# Patient Record
Sex: FEMALE | HISPANIC OR LATINO | Employment: STUDENT | URBAN - METROPOLITAN AREA
[De-identification: names, ages, dates, MRNs, and addresses within clinical notes are randomized per-mention and may not be internally consistent; named-entity substitution may affect disease eponyms.]

---
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
## 2020-01-29 ENCOUNTER — OFFICE VISIT (OUTPATIENT)
Dept: MEDICAL GROUP | Facility: PHYSICIAN GROUP | Age: 20
End: 2020-01-29
Payer: COMMERCIAL

## 2020-01-29 VITALS
WEIGHT: 172 LBS | HEART RATE: 97 BPM | DIASTOLIC BLOOD PRESSURE: 74 MMHG | SYSTOLIC BLOOD PRESSURE: 112 MMHG | BODY MASS INDEX: 34.68 KG/M2 | OXYGEN SATURATION: 99 % | HEIGHT: 59 IN | RESPIRATION RATE: 14 BRPM | TEMPERATURE: 98.5 F

## 2020-01-29 DIAGNOSIS — F90.9 ATTENTION DEFICIT HYPERACTIVITY DISORDER (ADHD), UNSPECIFIED ADHD TYPE: ICD-10-CM

## 2020-01-29 DIAGNOSIS — N91.2 AMENORRHEA: ICD-10-CM

## 2020-01-29 PROBLEM — E66.9 OBESITY (BMI 30-39.9): Status: ACTIVE | Noted: 2020-01-29

## 2020-01-29 PROCEDURE — 99214 OFFICE O/P EST MOD 30 MIN: CPT | Performed by: PHYSICIAN ASSISTANT

## 2020-01-29 ASSESSMENT — PATIENT HEALTH QUESTIONNAIRE - PHQ9: CLINICAL INTERPRETATION OF PHQ2 SCORE: 0

## 2020-01-29 NOTE — PROGRESS NOTES
"Subjective:   Eda Rothman is a 19 y.o. female here today for amenorrhea. Is a new patient to me and is also establishing care today.    Previous PCP: [facility]    HPI:    Patient presents to the office today to establish care with me. Her primary concern is regarding lack of menstruation. States hasn't had her period in about a year. Endorses prior Depo-provera use--estimates was on for about a year from age [age]. Last Depo shot was in October 2018. She states that this was originally started to control bad cramps. Denies ever being sexually active. Menarche was at 11-12. Endorses always having irregular periods--anything from multiple bleeding episodes of bleeding in a single month from going months in-between periods. No known h/o PCOS. Has noticed some facial acne recently, but nothing that she considers severe. Considers herself to always have been generally \"hairy\" person since childhood but nothing that she has considered to be abnormal.    Only other known medical problem is ADHD, diagnosed in childhood. Was on medication starting at age 15, but states she was taken off her Jay year as her parents didn't like the idea of her being on medication. She feels she is able to control symptoms fine. Does struggle with inattention/focus, but has been able to manage with things like daily reminder notes, alarms on her phone, etc.    Current medicines (including changes today)  Current Outpatient Medications   Medication Sig Dispense Refill   • Loratadine (CLARITIN PO) Take  by mouth.     • CHILD IBUPROFEN PO Take  by mouth.     • Acetaminophen (TYLENOL CHILDRENS PO) Take  by mouth.       No current facility-administered medications for this visit.      She  has a past medical history of ADHD. She also has no past medical history of ASTHMA or Diabetes.    ROS  As per HPI.       Objective:     /74 (BP Location: Right arm, Patient Position: Sitting, BP Cuff Size: Adult)   Pulse 97   Temp 36.9 " "°C (98.5 °F) (Temporal)   Resp 14   Ht 1.499 m (4' 11\")   Wt 78 kg (172 lb)   SpO2 99%  Body mass index is 34.74 kg/m².     Physical Exam:  Constitutional: Alert, well-appearing, no distress.  Skin: Warm, dry, good turgor, no rashes in visible areas. Mild facial acne--mostly comedonal scattered across forehead.  Eye: Pupils are equal and round, conjunctiva clear, lids normal.  ENMT: Lips without lesions, moist mucus membranes.  Neck: No masses. No submandibular or cervical lymphadenopathy. No palpable thyromegaly.  Respiratory: Unlabored respiratory effort, lungs clear to auscultation, no wheezes, no rhonchi.  Cardiovascular: Normal S1, S2, no murmur.      Assessment and Plan:   The following treatment plan was discussed    1. Amenorrhea  New problem, uncontrolled. Has had amenorrhea for 15 months. We discussed that there is possibility this represents prolonged amenorrhea post-Depo, but typically expect monthly cycles to return within a year. Especially given history of irregular periods even prior to starting Depo, recommend additional amenorrhea work-up. PCOS is consideration given her obesity, acne. Lab work ordered. Patient advised to follow up in a few weeks to discuss results.   - TESTOSTERONE F&T FEMALES/CHILD; Future  - TSH WITH REFLEX TO FT4; Future  - PROLACTIN; Future  - FSH/LH; Future  - ESTRADIOL; Future  - HCG QUANTITATIVE; Future  - CBC WITH DIFFERENTIAL; Future  - Comp Metabolic Panel; Future    2. Attention deficit hyperactivity disorder (ADHD), unspecified ADHD type  New problem to me, well-controlled with lifestyle modification. Will continue to monitor.        Followup: Return in about 3 weeks (around 2/19/2020) for f/u lab review; Short.    Danyelle Christianson P.A.-C.             "

## 2020-02-03 ENCOUNTER — NON-PROVIDER VISIT (OUTPATIENT)
Dept: OCCUPATIONAL MEDICINE | Facility: CLINIC | Age: 20
End: 2020-02-03

## 2020-02-03 DIAGNOSIS — Z02.1 PRE-EMPLOYMENT DRUG SCREENING: ICD-10-CM

## 2020-02-03 DIAGNOSIS — Z02.1 PRE-EMPLOYMENT HEALTH SCREENING EXAMINATION: ICD-10-CM

## 2020-02-03 LAB
AMP AMPHETAMINE: NORMAL
COC COCAINE: NORMAL
INT CON NEG: NORMAL
INT CON POS: NORMAL
MET METHAMPHETAMINES: NORMAL
OPI OPIATES: NORMAL
PCP PHENCYCLIDINE: NORMAL
POC DRUG COMMENT 753798-OCCUPATIONAL HEALTH: NEGATIVE
THC: NORMAL

## 2020-02-03 PROCEDURE — 80305 DRUG TEST PRSMV DIR OPT OBS: CPT | Performed by: NURSE PRACTITIONER

## 2020-02-18 ENCOUNTER — APPOINTMENT (OUTPATIENT)
Dept: MEDICAL GROUP | Facility: PHYSICIAN GROUP | Age: 20
End: 2020-02-18
Payer: COMMERCIAL

## 2020-06-25 ENCOUNTER — OFFICE VISIT (OUTPATIENT)
Dept: MEDICAL GROUP | Facility: PHYSICIAN GROUP | Age: 20
End: 2020-06-25
Payer: COMMERCIAL

## 2020-06-25 VITALS
OXYGEN SATURATION: 96 % | HEIGHT: 59 IN | SYSTOLIC BLOOD PRESSURE: 90 MMHG | WEIGHT: 166 LBS | DIASTOLIC BLOOD PRESSURE: 52 MMHG | BODY MASS INDEX: 33.47 KG/M2 | TEMPERATURE: 98.5 F | HEART RATE: 83 BPM

## 2020-06-25 DIAGNOSIS — L74.512 PRIMARY FOCAL HYPERHIDROSIS OF PALMS: ICD-10-CM

## 2020-06-25 DIAGNOSIS — F32.1 CURRENT MODERATE EPISODE OF MAJOR DEPRESSIVE DISORDER WITHOUT PRIOR EPISODE (HCC): ICD-10-CM

## 2020-06-25 DIAGNOSIS — K58.0 IRRITABLE BOWEL SYNDROME WITH DIARRHEA: ICD-10-CM

## 2020-06-25 DIAGNOSIS — N91.2 AMENORRHEA: ICD-10-CM

## 2020-06-25 DIAGNOSIS — F90.9 ATTENTION DEFICIT HYPERACTIVITY DISORDER (ADHD), UNSPECIFIED ADHD TYPE: ICD-10-CM

## 2020-06-25 DIAGNOSIS — E66.9 OBESITY (BMI 30-39.9): ICD-10-CM

## 2020-06-25 PROCEDURE — 99214 OFFICE O/P EST MOD 30 MIN: CPT | Performed by: PHYSICIAN ASSISTANT

## 2020-06-25 ASSESSMENT — PATIENT HEALTH QUESTIONNAIRE - PHQ9
CLINICAL INTERPRETATION OF PHQ2 SCORE: 3
5. POOR APPETITE OR OVEREATING: 3 - NEARLY EVERY DAY
SUM OF ALL RESPONSES TO PHQ QUESTIONS 1-9: 16

## 2020-06-26 NOTE — PATIENT INSTRUCTIONS
Aluminum Chloride topical solution  What is this medicine?  Aluminum Chloride (a LOO mi num klor vivi) is used to control excessive sweating.  This medicine may be used for other purposes; ask your health care provider or pharmacist if you have questions.  COMMON BRAND NAME(S): Drysol, Hypercare, Xerac AC  What should I tell my health care provider before I take this medicine?  They need to know if you have any of these conditions:  · an unusual or allergic reaction to aluminum chloride, other medicines, foods, dyes, or preservatives  · pregnant or trying to get pregnant  · breast-feeding  How should I use this medicine?  This medicine is for external use only. Follow the directions on the prescription label. Make sure the skin is dry before use. Apply to the affected area as directed by your doctor or health care professional, usually at bedtime. Avoid contact with broken, irritated or recently shaved skin. Do not use your medicine more often than directed.  Talk to your pediatrician regarding the use of this medicine in children. Special care may be needed.  Overdosage: If you think you have taken too much of this medicine contact a poison control center or emergency room at once.  NOTE: This medicine is only for you. Do not share this medicine with others.  What if I miss a dose?  If you miss a dose, use it as soon as you can. If it is almost time for your next dose, use only that dose. Do not use double or extra doses.  What may interact with this medicine?  Interactions are not expected. Do not use any other skin products on the affected area without asking your doctor or health care professional.  This list may not describe all possible interactions. Give your health care provider a list of all the medicines, herbs, non-prescription drugs, or dietary supplements you use. Also tell them if you smoke, drink alcohol, or use illegal drugs. Some items may interact with your medicine.  What should I watch for while  using this medicine?  You may notice a decrease in sweating after two treatments. Call your doctor or health care professional if your condition does not start to get better or if it gets worse.  To help increase the effect of this medicine, your doctor or health care professional may tell you to cover the treated area with saran wrap held in place by a snug fitting t-shirt, mitten or sock. Do not use tape to hold the saran wrap in place.  This medicine may discolor fabrics and may be harmful to certain metals. Avoid contact with clothing and jewelry.  Do not use this medicine near open flame.  What side effects may I notice from receiving this medicine?  Side effects that you should report to your doctor or health care professional as soon as possible:  · allergic reactions like skin rash, itching or hives, swelling of the face, lips, or tongue  · excessive irritation or sensitivity  Side effects that usually do not require medical attention (report to your doctor or health care professional if they continue or are bothersome):  · mild irritation  This list may not describe all possible side effects. Call your doctor for medical advice about side effects. You may report side effects to FDA at 6-295-FDA-4672.  Where should I keep my medicine?  Keep out of the reach of children.  Store at room temperature between 15 and 30 degrees C (59 and 86 degrees C). Throw away any unused medicine after the expiration date.  NOTE: This sheet is a summary. It may not cover all possible information. If you have questions about this medicine, talk to your doctor, pharmacist, or health care provider.  © 2020 [employer]/Gold Standard (2014-10-22 17:30:08)

## 2020-06-26 NOTE — PROGRESS NOTES
"Subjective:   Eda Rothman is a 19 y.o. female here today for follow-up on OCPs, multiple other concerns. Is an established patient of mine.    HPI:    Patient presents to the office today to discuss the following:     -is requesting to start on Depo-Provera. Has used in the past--most recently in 2018. The only side effect she noticed was that she felt it made her somewhat more \"emotional\", but nothing that she considers to be significant. I last saw patient in January of this year.  At that time, she had complained of amenorrhea.  Had not had her menstrual period in about a year at that time and endorsed long history of irregular menstruation.  Lab panel was ordered which she has not yet had done.     -complains of excessively sweaty hands. States her hands have always been this way but it's getting to the point where she's very bothered by it. States her hands will get really smelly. Wondering this there's anything she can do for this.    -complains of ongoing stomach issues. States that on a daily basis, she's been developing a cramping sensation in her lower abdomen along with a feeling of being \"bloated.\" Notices this mostly just in the mornings and at night. States that within 15 minutes of getting up in the morning, she'll develop the abdominal pain along with fecal urgency to the point where she'll feel the need to run to the bathroom. When she does go to the bathroom, will pass soft, unformed stool, but then feel immediately better. She notices this same pattern of symptoms after dinner in the evening. She denies any hematochezia or melena. Has not had nausea or vomiting, but has noticed increased belching. She's not exactly sure how long symptoms have been going on for. She has noticed that symptoms seem to be worse when she eats/drinks lactose-containing foods/drinks, but has noticed when she consumes other things, too. She has not yet tried any over-the-counter medication for her symptoms.    -patient " "also expresses concern regarding depression. She tells me that lately, she's noticed that she'll get very down. She has a lot of feelings that she's disappointed her family. Often will cry for no apparent reason. She worries a lot about her future. States that her family has very high expectations for her and feels like they \"talk down\" to her. She's currently attending college at [employer]. 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 She feels that she struggles a lot in school. Endorses significant difficulty concentrating and has trouble with deadlines and procrastination. She carries diagnosis of ADHD. Was on stimulant medication (Ritalin) in high school, but hasn't taken since 2016. She states that her parents don't like the idea of her being on medication which is why she stopped it. She currently works for the [employer]. She enjoys her job very much and doesn't feel that her depression has affected her ability to do her job at all. She is able to feel happiness when around her friends, as she feels that her friends lift her up more than her family does. She's had some scattered infrequent thoughts of suicide but nothing that she's planned or feels she would act on. PHQ-9 screening was completed today--see below.    Depression Screening    Little interest or pleasure in doing things?  1 - several days   Feeling down, depressed , or hopeless? 2 - more than half the days   Trouble falling or staying asleep, or sleeping too much?  3 - nearly every day   Feeling tired or having little energy?  2 - more than half the days   Poor appetite or overeating?  3 - nearly every day   Feeling bad about yourself - or that you are a failure or have let yourself or your family down? 1 - several days   Trouble concentrating on things, such as reading the newspaper or watching television? 3 - nearly every day   Moving or speaking so slowly that other " "people could have noticed.  Or the opposite - being so fidgety or restless that you have been moving around a lot more than usual?  0 - not at all   Thoughts that you would be better off dead, or of hurting yourself?  1 - several days   Patient Health Questionnaire Score: 16       Current medicines (including changes today)  Current Outpatient Medications   Medication Sig Dispense Refill   • aluminum chloride (DRYSOL) 20 % external solution Apply once daily to hands at bedtime; once excessive sweating has stopped, may decrease to once or twice weekly, or as needed. Wash treated area in the morning. 1 Bottle 5     No current facility-administered medications for this visit.      She  has a past medical history of ADHD. She also has no past medical history of ASTHMA or Diabetes.    ROS  As per HPI.       Objective:     BP (!) 90/52 (BP Location: Left arm, Patient Position: Sitting, BP Cuff Size: Adult)   Pulse 83   Temp 36.9 °C (98.5 °F) (Tympanic)   Ht 1.499 m (4' 11\")   Wt 75.3 kg (166 lb)   SpO2 96%  Body mass index is 33.53 kg/m².     Physical Exam:  Constitutional: Alert, no distress.  Psychiatric: Fully oriented with fluent speech. Affect is appropriate with euthymic mood. Patient maintains good eye contact, appears pleasant and conversational. No psychomotor agitation. Insight and judgment are intact.  Skin: No rashes in visible areas.  Eye: Pupils are equal and round, conjunctiva clear, lids normal.  ENMT: Lips without lesions, moist mucus membranes.  Neck: Normal-appearing.  Respiratory: Unlabored respiratory effort.      Assessment and Plan:   The following treatment plan was discussed    1. Amenorrhea  Established problem, uncontrolled. Prior to initiation of birth control method, would like patient to have labs done. Advised to schedule follow-up appointment afterwards to discuss results. If lab work is not suggestive of PCOS, amenorrhea likely secondary to obesity.    2. Primary focal hyperhidrosis " of palms  New problem, uncontrolled. Has localized excessive sweating of palms which has been present since childhood. We discussed that this represents condition called hyperhidrosis. Will trial on topical Drysol solution. Patient agreeable.  - aluminum chloride (DRYSOL) 20 % external solution; Apply once daily to hands at bedtime; once excessive sweating has stopped, may decrease to once or twice weekly, or as needed. Wash treated area in the morning.  Dispense: 1 Bottle; Refill: 5    3. Irritable bowel syndrome with diarrhea  New problem, uncontrolled. Patient's GI symptoms are most consistent with likely IBS. No current red flag symptoms to suggest more sinister diagnosis. We discussed importance of starting food diary to pinpoint other triggers besides lactose. We discussed that she can use OTC Imodium as-needed for diarrhea. Will continue to monitor symptoms.    4. Current moderate episode of major depressive disorder without prior episode (HCC)  New problem, uncontrolled. Patient has been feeling depressed lately, mostly triggered by her belief that she isn't living up to her family's expectations. Her depression has not affected her job at all and she denies any active suicide intent. I do think that her ADHD is contributing to her depression given poor school performance from lack of concentration. I am recommending behavioral health evaluation to discuss getting back on ADHD medication and/or possibly anti-depressant.  - REFERRAL TO BEHAVIORAL HEALTH  - Patient has been identified as having a positive depression screening. Appropriate orders and counseling have been given.    5. Attention deficit hyperactivity disorder (ADHD), unspecified ADHD type  Established problem, uncontrolled, see above.  - REFERRAL TO BEHAVIORAL HEALTH    6. Obesity (BMI 30-39.9)  - Patient identified as having weight management issue.  Appropriate orders and counseling given.      Followup: Return for f/u lab results;  [place].    Danyelle Christianson P.A.-C.

## 2020-07-01 ENCOUNTER — APPOINTMENT (OUTPATIENT)
Dept: LAB | Facility: MEDICAL CENTER | Age: 20
End: 2020-07-01
Attending: PHYSICIAN ASSISTANT

## 2020-07-09 LAB
ALBUMIN SERPL-MCNC: 4.7 G/DL (ref 3.9–5)
ALBUMIN/GLOB SERPL: 1.9 {RATIO} (ref 1.2–2.2)
ALP SERPL-CCNC: 96 IU/L (ref 39–117)
ALT SERPL-CCNC: 21 IU/L (ref 0–32)
AST SERPL-CCNC: 20 IU/L (ref 0–40)
BASOPHILS # BLD AUTO: 0 X10E3/UL (ref 0–0.2)
BASOPHILS NFR BLD AUTO: 0 %
BILIRUB SERPL-MCNC: 0.4 MG/DL (ref 0–1.2)
BUN SERPL-MCNC: 13 MG/DL (ref 6–20)
BUN/CREAT SERPL: 19 (ref 9–23)
CALCIUM SERPL-MCNC: 9.6 MG/DL (ref 8.7–10.2)
CHLORIDE SERPL-SCNC: 104 MMOL/L (ref 96–106)
CO2 SERPL-SCNC: 22 MMOL/L (ref 20–29)
CREAT SERPL-MCNC: 0.67 MG/DL (ref 0.57–1)
EOSINOPHIL # BLD AUTO: 0 X10E3/UL (ref 0–0.4)
EOSINOPHIL NFR BLD AUTO: 1 %
ERYTHROCYTE [DISTWIDTH] IN BLOOD BY AUTOMATED COUNT: 12.9 % (ref 11.7–15.4)
ESTRADIOL SERPL-MCNC: 34 PG/ML
FSH SERPL-ACNC: 4.5 MIU/ML
GLOBULIN SER CALC-MCNC: 2.5 G/DL (ref 1.5–4.5)
GLUCOSE SERPL-MCNC: 86 MG/DL (ref 65–99)
HCG INTACT+B SERPL-ACNC: <1 MIU/ML
HCT VFR BLD AUTO: 45.5 % (ref 34–46.6)
HGB BLD-MCNC: 15.4 G/DL (ref 11.1–15.9)
IMM GRANULOCYTES # BLD AUTO: 0 X10E3/UL (ref 0–0.1)
IMM GRANULOCYTES NFR BLD AUTO: 0 %
IMMATURE CELLS  115398: NORMAL
LH SERPL-ACNC: 3.2 MIU/ML
LYMPHOCYTES # BLD AUTO: 2.9 X10E3/UL (ref 0.7–3.1)
LYMPHOCYTES NFR BLD AUTO: 35 %
MCH RBC QN AUTO: 29.2 PG (ref 26.6–33)
MCHC RBC AUTO-ENTMCNC: 33.8 G/DL (ref 31.5–35.7)
MCV RBC AUTO: 86 FL (ref 79–97)
MONOCYTES # BLD AUTO: 0.6 X10E3/UL (ref 0.1–0.9)
MONOCYTES NFR BLD AUTO: 7 %
MORPHOLOGY BLD-IMP: NORMAL
NEUTROPHILS # BLD AUTO: 4.6 X10E3/UL (ref 1.4–7)
NEUTROPHILS NFR BLD AUTO: 57 %
NRBC BLD AUTO-RTO: NORMAL %
PLATELET # BLD AUTO: 318 X10E3/UL (ref 150–450)
POTASSIUM SERPL-SCNC: 4.2 MMOL/L (ref 3.5–5.2)
PROLACTIN SERPL-MCNC: 22.7 NG/ML (ref 4.8–23.3)
PROT SERPL-MCNC: 7.2 G/DL (ref 6–8.5)
RBC # BLD AUTO: 5.27 X10E6/UL (ref 3.77–5.28)
SODIUM SERPL-SCNC: 140 MMOL/L (ref 134–144)
TESTOST FREE SERPL-MCNC: 3.9 PG/ML
TESTOST SERPL-MCNC: 22.9 NG/DL (ref 10–55)
TSH SERPL DL<=0.005 MIU/L-ACNC: 1.07 UIU/ML (ref 0.45–4.5)
WBC # BLD AUTO: 8.1 X10E3/UL (ref 3.4–10.8)

## 2020-07-15 ENCOUNTER — TELEPHONE (OUTPATIENT)
Dept: MEDICAL GROUP | Facility: PHYSICIAN GROUP | Age: 20
End: 2020-07-15

## 2020-07-15 NOTE — TELEPHONE ENCOUNTER
----- Message from Danyelle Christianson P.A.-C. sent at 7/14/2020  9:34 AM PDT -----  Please call patient to remind her to schedule follow-up to go through lab results. She never read the [email or web address] message I previously sent.  Danyelle Christianson P.A.-C.

## 2020-07-17 ENCOUNTER — OFFICE VISIT (OUTPATIENT)
Dept: MEDICAL GROUP | Facility: PHYSICIAN GROUP | Age: 20
End: 2020-07-17
Payer: COMMERCIAL

## 2020-07-17 VITALS
TEMPERATURE: 98.6 F | RESPIRATION RATE: 16 BRPM | WEIGHT: 161.4 LBS | HEART RATE: 76 BPM | HEIGHT: 59 IN | OXYGEN SATURATION: 98 % | DIASTOLIC BLOOD PRESSURE: 60 MMHG | BODY MASS INDEX: 32.54 KG/M2 | SYSTOLIC BLOOD PRESSURE: 112 MMHG

## 2020-07-17 DIAGNOSIS — Z30.013 INITIATION OF DEPO PROVERA: ICD-10-CM

## 2020-07-17 DIAGNOSIS — E66.9 OBESITY (BMI 30-39.9): ICD-10-CM

## 2020-07-17 LAB
INT CON NEG: NEGATIVE
INT CON POS: POSITIVE
POC URINE PREGNANCY TEST: NORMAL

## 2020-07-17 PROCEDURE — 99213 OFFICE O/P EST LOW 20 MIN: CPT | Mod: 25 | Performed by: PHYSICIAN ASSISTANT

## 2020-07-17 PROCEDURE — 81025 URINE PREGNANCY TEST: CPT | Performed by: PHYSICIAN ASSISTANT

## 2020-07-17 RX ORDER — MEDROXYPROGESTERONE ACETATE 150 MG/ML
150 INJECTION, SUSPENSION INTRAMUSCULAR ONCE
Status: COMPLETED | OUTPATIENT
Start: 2020-07-17 | End: 2020-07-17

## 2020-07-17 RX ADMIN — MEDROXYPROGESTERONE ACETATE 150 MG: 150 INJECTION, SUSPENSION INTRAMUSCULAR at 17:15

## 2020-07-17 ASSESSMENT — FIBROSIS 4 INDEX: FIB4 SCORE: 0.26

## 2020-07-17 NOTE — PROGRESS NOTES
"Subjective:   Eda Rothman is a 19 y.o. female here today for birth control, lab results. Is an established patient of mine.    HPI:    Patient presents to the office today for discussion of the above. She recently had screening lab work done for further work-up of oligomenorrhea which was unremarkable. Patient has had two menstrual periods in the last 6 months--February and most recently in late June. She was previously on Depo-Provera injections which she stopped in 2018. Previously tolerated well. Does not feel she'd do well with OCPs due to forgetting to take.       Current medicines (including changes today)  Current Outpatient Medications   Medication Sig Dispense Refill   • aluminum chloride (DRYSOL) 20 % external solution Apply once daily to hands at bedtime; once excessive sweating has stopped, may decrease to once or twice weekly, or as needed. Wash treated area in the morning. 1 Bottle 5     Current Facility-Administered Medications   Medication Dose Route Frequency Provider Last Rate Last Dose   • medroxyPROGESTERone (DEPO-PROVERA) injection 150 mg  150 mg Intramuscular Once Danyelle Christianson P.A.-C.         She  has a past medical history of ADHD. She also has no past medical history of ASTHMA or Diabetes.    ROS  As per HPI.       Objective:     /60 (BP Location: Right arm, Patient Position: Sitting, BP Cuff Size: Adult)   Pulse 76   Temp 37 °C (98.6 °F) (Tympanic)   Resp 16   Ht 1.499 m (4' 11\")   Wt 73.2 kg (161 lb 6.4 oz)   SpO2 98%  Body mass index is 32.6 kg/m².     Physical Exam:  Constitutional: Alert, well-appearing, very pleasant, no distress.  Psychiatric: Fully oriented with fluent speech. Affect is appropriate with euthymic mood.  Skin: No rashes in visible areas.  Eye: Pupils are equal and round, conjunctiva clear, lids normal.  ENMT: External ears and nose without lesions. Lips without lesions, moist mucus membranes.  Neck: Normal-appearing.  Respiratory: Unlabored " respiratory effort.      Assessment and Plan:   The following treatment plan was discussed    1. Initiation of Depo Provera  New concern. Patient with unremarkable lab work meaning that oligomenorrhea likely secondary to her obesity. She is wanting to re-start Depo which she did fine on before. We discussed risk of weight gain. Recommend she closely monitor her weight while taking. Urine pregnancy test today was negative so she was given her first injection. Instructed to f/u in clinic every 3 months for repeat injection.   - POC URINE PREGNANCY  - medroxyPROGESTERone (DEPO-PROVERA) injection 150 mg    2. Obesity (BMI 30-39.9)  Established problem, uncontrolled. She plans to closely monitor her weight going forward. Will continue lifestyle modifications in an effort to lose weight.      Followup: Return for establish care with new PCP.    Danyelle Christianson P.A.-C.

## 2020-07-29 DIAGNOSIS — F90.2 ATTENTION DEFICIT HYPERACTIVITY DISORDER (ADHD), COMBINED TYPE: ICD-10-CM

## 2020-07-29 DIAGNOSIS — F32.1 CURRENT MODERATE EPISODE OF MAJOR DEPRESSIVE DISORDER WITHOUT PRIOR EPISODE (HCC): ICD-10-CM

## 2020-07-30 ENCOUNTER — DOCUMENTATION (OUTPATIENT)
Dept: BEHAVIORAL HEALTH | Facility: CLINIC | Age: 20
End: 2020-07-30

## 2020-07-30 ENCOUNTER — TELEMEDICINE (OUTPATIENT)
Dept: BEHAVIORAL HEALTH | Facility: CLINIC | Age: 20
End: 2020-07-30

## 2020-07-30 VITALS — HEIGHT: 60 IN | BODY MASS INDEX: 30.82 KG/M2 | WEIGHT: 157 LBS

## 2020-07-30 DIAGNOSIS — F32.1 CURRENT MODERATE EPISODE OF MAJOR DEPRESSIVE DISORDER WITHOUT PRIOR EPISODE (HCC): ICD-10-CM

## 2020-07-30 DIAGNOSIS — F90.2 ADHD (ATTENTION DEFICIT HYPERACTIVITY DISORDER), COMBINED TYPE: ICD-10-CM

## 2020-07-30 PROCEDURE — 90792 PSYCH DIAG EVAL W/MED SRVCS: CPT | Performed by: PSYCHIATRY & NEUROLOGY

## 2020-07-30 RX ORDER — AMOXICILLIN 500 MG/1
500 CAPSULE ORAL
COMMUNITY
Start: 2020-07-22 | End: 2020-11-06

## 2020-07-30 RX ORDER — METHYLPHENIDATE HYDROCHLORIDE 20 MG/1
20 TABLET ORAL 2 TIMES DAILY
Qty: 60 TAB | Refills: 0 | Status: SHIPPED | OUTPATIENT
Start: 2020-07-30 | End: 2020-08-21 | Stop reason: SDUPTHER

## 2020-07-30 RX ORDER — FLUOXETINE HYDROCHLORIDE 20 MG/1
20 CAPSULE ORAL DAILY
Qty: 30 CAP | Refills: 2 | Status: SHIPPED | OUTPATIENT
Start: 2020-07-30 | End: 2020-08-21 | Stop reason: SDUPTHER

## 2020-07-30 ASSESSMENT — FIBROSIS 4 INDEX: FIB4 SCORE: 0.26

## 2020-07-30 NOTE — PROGRESS NOTES
" [facility] INITIAL PSYCHIATRIC EVALUATION    This provider informed the patient their medical records are totally confidential except for the use by other providers involved in their care, or if the patient signs a release, or to report instances of child or elder abuse, or if it is determined they are an immediate risk to harm themselves or others.  To avoid spread of covid-019 virus this appointment was conducted by telehealth.  The patient gave consent to have this evaluation by video telehealth.    Time at beginning of call:  10:30 AM    TOTAL FACE-TO-FACE TIME 60 minutes    CHIEF COMPLAINT       Having depressed mood and problems with focus of attention.    IDENTIFYING INFORMATION       The patient is a single 18yo W female who is enrolled at [employer] for criminal justice major and who lives with her parents in [place], [place].    HISTORY OF PRESENT ILLNESS       The patient has had Major Depression, Single Episode since Jun 2020 characterized by a typical pattern of anhedonia, hypersomnia (she sleepd 9 hours a day) and increased appetite, markedly decreased energy, concentration, interest, and motivation, feeling worthless and hopeless, having moderate psychomotor retardation, and having fleeting passive suicidal ideation that \"people might better off without her\".  She denies ever having an overt suicidal plan, intent, impulse, or attempt.  Wen often feels she has disappointed her family since they have high expectations for her.         She also has ADHD, Combined Type which was treated when she was in high school with the stimulant methylphenidate.   She has symptoms of inattention including: failing to give close attention to details, having difficulty sustaining attention, not seeming to listen when spoken to directly, not following through on instructions, having difficulty organizing tasks, losing things necessary for tasks, being easily distracted by extraneous " stimuli, and being very forgetful in daily activities.  She also has hyperactivity and will often fidget and shakes her hand or picks on her nail, she talks excessively, and she has difficulty awaiting her turn and she will interrupt or intrude on others.  She is having difficulty in college because of her attention problems.    PSYCHIATRIC REVIEW OF SYSTEMS  denies symptoms of anxiety that interfere with functioning or are overwhelming, denies manic symptoms, denies psychotic symptoms including AH / VH, denies OCD symptoms, denies restrictive eating or purging, denies trauma related symptoms and see HPI for depressive symptoms    MEDICAL REVIEW OF SYSTEMS:   Constitutional positive - obesity   Eyes negative   Ears/Nose/Mouth/Throat negative   Cardiovascular negative   Respiratory negative   Gastrointestinal positive - irritable bowel syndroime   Genitourinary negative   Muscular negative   Integumentary negative   Neurological positive - hyperhydrosis of palms   Endocrine positive - hypogycemia   Hematologic/Lymphatic negative       PAST PSYCHIATRIC HISTORY       Major Depresson, Single Episode and ADHD, Combined Type.  She denies ever having an overt suicidal plan, intent, impulse, or attempt, or a manic or hypomanic episode.  PAST PSYCHIATRIC MEDICATIONS       Methylphenidate  SOCIAL HISTORY       Born in [place], [place] and raised in [place], [place].  She is going into her sophomore year at [employer] studying criminal justice and she wants to go into behavioral analysis..  DRUGS none  ALCOHOL none  TOBACCO nonsmoker    MEDICAL HISTORY       Obesity, hyperhydrosis of palms, IBS, hypoglycemia.  CURRENT MEDICATIONS       none  ALLERGIES       none  MENTAL STATUS EXAMINATION    There were no vitals taken for this visit.  Participation: Active verbal participation  Grooming:Neat  Orientation: Fully Oriented  Eye contact: Good  Behavior:Calm   Mood: Euthymic  Affect: Full range  Thought process: Logical  Thought content:  Within  normal limits  Speech: Rate within normal limits and Volume within normal limits  Perception:  Within normal limits  Memory:  No gross evidence of memory deficits  Insight: Good  Judgment: Good  Family/couple interaction observations:   Other:  Depression Screen (PHQ-2/PHQ-9) 1/29/2020 6/25/2020 5/27/2021   PHQ-2 Total Score - - 0   PHQ-2 Total Score 0 3 -   PHQ-9 Total Score - - 10   PHQ-9 Total Score - 16 -       Interpretation of PHQ-9 Total Score   Score Severity   1-4 No Depression   5-9 Mild Depression   10-14 Moderate Depression   15-19 Moderately Severe Depression   20-27 Severe Depression  CURRENT RISK       Suicidal:Low       Homicidal:Not applicable       Self-Harm:Low       Relapse:Moderate       Crisis Safety Plan Reviewed Not Indicated    ASSESSMENT       Having single episode of depression and will be started on fluoxetine 20mg po QAM.  She will also be started on methylphenidate 20mg po BID for ADHD.  DIFFERENTIAL DIAGNOSES       (1) Major Depression, Single Episode, Moderate (F32.1)       (2) ADHD, Combined Type (F90.2)  PLAN       Start fluoxetine 20mg po QAM for depression.       Start methylphenidate 20mg po BID for ADHD.       RTC to  Clinic in 2 months for 30 min follow up with this provider.    Time at end of call:  11:30 AM    Patient was seen for 60 minutes face to face of which > 50% of appointment time was spent on counseling and coordination of care regarding the above.

## 2020-08-21 DIAGNOSIS — F90.2 ADHD (ATTENTION DEFICIT HYPERACTIVITY DISORDER), COMBINED TYPE: ICD-10-CM

## 2020-08-21 RX ORDER — METHYLPHENIDATE HYDROCHLORIDE 20 MG/1
20 TABLET ORAL DAILY
Qty: 30 TAB | Refills: 0 | Status: SHIPPED | OUTPATIENT
Start: 2020-08-21 | End: 2020-09-20

## 2020-08-21 RX ORDER — FLUOXETINE HYDROCHLORIDE 20 MG/1
40 CAPSULE ORAL DAILY
Qty: 60 CAP | Refills: 2 | Status: SHIPPED | OUTPATIENT
Start: 2020-08-21 | End: 2020-09-20

## 2020-09-30 ENCOUNTER — APPOINTMENT (OUTPATIENT)
Dept: BEHAVIORAL HEALTH | Facility: CLINIC | Age: 20
End: 2020-09-30

## 2020-09-30 NOTE — PROGRESS NOTES
"                                  [facility] PSYCHIATRIC FOLLOW-UP NOTE    This provider informed the patient their medical records are totally confidential except for the use by other providers involved in their care, or if the patient signs a release, or to report instances of child or elder abuse, or if it is determined they are an immediate risk to harm themselves or others.  To avoid spread of covid-19 virus this appointment was conducted by telehealth.  The patient gave consent to have this follow up session by video telehealth.    Time at beginning of call:  02:30 PM    TOTAL FACE-TO-FACE TIME  30 minutes    CHIEF COMPLAINT       Having depression and problems with focus of attention.    HISTORY OF PRESENT ILLNESS       The patient is a single 20yol W female and student at [employer] who is followed by this provider for Major Depression, Single Episode and ADHD, Combined Type.  Her depression has had an atypical pattern of anhedonia, hypersomnia and hyperphagia, feeling worthless and hopeless, and having moderate psychomotor retardation, and she has passive suicidal ideation \"thast p[eople would be better off without her\".  She denies ever having an overt suicidal plan, intent, or attempt.  She constantly worries that she has disappointed her family.       She also has ADHD, Combined Type and has difficulty sustaining and is easily distracted by extraneous stimuli and is very forgetful in daily activities.    She also is hyperactive and fidgets and has difficulty awaiting her turn.  Her attention problems are causing her difficulty in her college classes.  She was started on methylphenidate 20mg po BID and has some improvement in her focus of attention.     PSYCHOSOCIAL CHANGES SINCE PREVIOUS CONTACT       ***  RESPONSE TO TREATMENT       ***  PAST PSYCHIATRIC MEDICATIONS       Methylphenidate  MEDICATION SIDE EFFECTS       ***    MEDICAL REVIEW OF SYSTEMS:   Constitutional positive - obesity   Eyes " negative   Ears/Nose/Mouth/Throat negative   Cardiovascular negative   Respiratory negative   Gastrointestinal positive - irritable bowel syndroime   Genitourinary negative   Muscular negative   Integumentary negative   Neurological positive - hyperhydrosis of palms   Endocrine positive - hypogycemia   Hematologic/Lymphatic negative       MENTAL STATUS EVALUATION  There were no vitals taken for this visit.  Participation: {[facility] PARTICIPATION MEASURES:67048093}  Grooming:{[facility] GROOMIN}  Orientation: {[facility] ORIENTATION:24762936}  Eye contact: {[facility] EYE CONTACT:00508995}  Behavior:{[facility] BEHAVIOR:77395245}   Mood: {[facility] MOOD:63402101}  Affect: {[facility] AFFECT:50320409}  Thought process: {[facility] THOUGHT PROCESS:99693314}  Thought content:  {[facility] THOUGHT CONTENT:96717069}  Speech: {[facility] SPEECH:85728286}  Perception:  {[facility] PERCEPTION:97117772}  Memory:  {[facility] MEMORY:07783192}  Insight: {GOOD/ADEQUATE/LIMITED/POOR:66055558}  Judgment: {GOOD/ADEQUATE/LIMITED/POOR:07609126}  Family/couple interaction observations:   Other:    Current risk:    Suicide: Low   Homicide: Not applicable   Self-harm: Low  Relapse: Moderate  Other:   Crisis Safety Plan reviewed?No  If evidence of imminent risk is present, intervention/plan:    Medical Records/Labs/Diagnostic Tests Reviewed: yes    Medical Records/Labs/Diagnostic Tests Ordered: no    DIAGNOSTIC IMPRESSIONS       (1) Major Depression, Single Episode,  Moderate (FG32.1)       (2) ADHD, combined type (F90.2)    ASSESSMENT AND PLAN       Having better focus of attention on methylphenidate 20mg po BID.  She has partial alleviation of depression on fluoxetine 20mg po QAM.       Continue fluoxetine 20mg po QAM.       Continue methyphenidate 20mg marj BID.       RTC to  Clinic in 2 months for 30  Min follow up; with this provider.    Time at end opf cvall:  03:00 PM    30 minutes spent in psychotherapy  Topics addressed in psychotherapy include:  Encouraged the patient to be more  self-observant.     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

## 2020-11-06 ENCOUNTER — TELEMEDICINE (OUTPATIENT)
Dept: MEDICAL GROUP | Facility: MEDICAL CENTER | Age: 20
End: 2020-11-06
Payer: COMMERCIAL

## 2020-11-06 VITALS — WEIGHT: 151 LBS | BODY MASS INDEX: 30.44 KG/M2 | HEIGHT: 59 IN | TEMPERATURE: 97.3 F

## 2020-11-06 DIAGNOSIS — N91.2 AMENORRHEA: ICD-10-CM

## 2020-11-06 DIAGNOSIS — L70.0 ACNE VULGARIS: ICD-10-CM

## 2020-11-06 DIAGNOSIS — N62 LARGE BREASTS: ICD-10-CM

## 2020-11-06 DIAGNOSIS — K58.0 IRRITABLE BOWEL SYNDROME WITH DIARRHEA: ICD-10-CM

## 2020-11-06 DIAGNOSIS — E66.9 OBESITY (BMI 30-39.9): ICD-10-CM

## 2020-11-06 DIAGNOSIS — F90.2 ADHD (ATTENTION DEFICIT HYPERACTIVITY DISORDER), COMBINED TYPE: ICD-10-CM

## 2020-11-06 DIAGNOSIS — F32.1 CURRENT MODERATE EPISODE OF MAJOR DEPRESSIVE DISORDER WITHOUT PRIOR EPISODE (HCC): ICD-10-CM

## 2020-11-06 PROCEDURE — 99214 OFFICE O/P EST MOD 30 MIN: CPT | Performed by: PHYSICIAN ASSISTANT

## 2020-11-06 RX ORDER — LORATADINE 10 MG/1
10 TABLET ORAL DAILY
COMMUNITY
End: 2021-05-28

## 2020-11-06 RX ORDER — DEXTROAMPHETAMINE SACCHARATE, AMPHETAMINE ASPARTATE, DEXTROAMPHETAMINE SULFATE AND AMPHETAMINE SULFATE 1.25; 1.25; 1.25; 1.25 MG/1; MG/1; MG/1; MG/1
5 TABLET ORAL EVERY MORNING
Qty: 30 TAB | Refills: 0 | Status: SHIPPED | OUTPATIENT
Start: 2020-11-06 | End: 2020-12-06

## 2020-11-06 RX ORDER — METHYLPHENIDATE HYDROCHLORIDE EXTENDED RELEASE 20 MG/1
TABLET ORAL
COMMUNITY
End: 2020-11-06

## 2020-11-06 RX ORDER — FLUOXETINE HYDROCHLORIDE 20 MG/1
20 CAPSULE ORAL DAILY
COMMUNITY
End: 2020-11-06

## 2020-11-06 RX ORDER — ERYTHROMYCIN AND BENZOYL PEROXIDE 30; 50 MG/G; MG/G
1 GEL TOPICAL DAILY
Qty: 30 G | Refills: 3 | Status: SHIPPED | OUTPATIENT
Start: 2020-11-06 | End: 2021-04-05 | Stop reason: SDUPTHER

## 2020-11-06 RX ORDER — MEDROXYPROGESTERONE ACETATE 150 MG/ML
150 INJECTION, SUSPENSION INTRAMUSCULAR
COMMUNITY
End: 2020-11-06

## 2020-11-06 ASSESSMENT — FIBROSIS 4 INDEX: FIB4 SCORE: 0.27

## 2020-11-06 NOTE — ASSESSMENT & PLAN NOTE
Stopped the ritalin because it felt like it made the depression worse. Parents noticed a change in personality. Usually very bubbly, outspoken. Stopped the medication and that helped her get back to normal. Not in classes this semester, restarting in January, would like to consider another med as she does have significant issues with focus and concentration in school and when studying. Psychiatry note reviewed. ADHD dx confirmed. No h/o substance abuse or use.  verified.

## 2020-11-06 NOTE — ASSESSMENT & PLAN NOTE
Started growing breast tissue age 8. Very large. Hard to exercise d/t size. Getting a lot of neck, back, shoulder pain. Getting rashes. Had to quit cheerleading because of it. Working with a plastic surgeon to get breast reduction. He wanted her weight to get down to 135 before doing the surgery. Dr. Bee.

## 2020-11-06 NOTE — ASSESSMENT & PLAN NOTE
Taking prozac only when she feels really bad, was confused about the instructions, not really feeling very depressed right now but would like to see a therapist

## 2020-11-06 NOTE — ASSESSMENT & PLAN NOTE
History of. Better after cutting out dairy. Occasional ice cream. Trying to watch diet and that helps. Still has pretty loose bowel movements but otherwise the pain.

## 2020-11-09 NOTE — PROGRESS NOTES
Virtual Visit: Established Patient   This visit was conducted via zoom using secure and encrypted videoconferencing technology. The patient was in a private location in the [facility]    The patient's identity was confirmed and verbal consent was obtained for this virtual visit.    Subjective:   CC:   Chief Complaint   Patient presents with   • Establish Care     Severe Depression, ADHD, suffers physical & emotionally from large breast, had a panic attack       Eda Rothman is a 20 y.o. female presenting for evaluation and management of:    Large breasts  Started growing breast tissue age 8. Very large. Hard to exercise d/t size. Getting a lot of neck, back, shoulder pain. Getting rashes. Had to quit cheerleading because of it. Working with a plastic surgeon to get breast reduction. He wanted her weight to get down to 135 before doing the surgery. Dr. Bee.    Irritable bowel syndrome with diarrhea-suspected  History of. Better after cutting out dairy. Occasional ice cream. Trying to watch diet and that helps. Still has pretty loose bowel movements but otherwise the pain.     ADHD (attention deficit hyperactivity disorder), combined type  Stopped the ritalin because it felt like it made the depression worse. Parents noticed a change in personality. Usually very bubbly, outspoken. Stopped the medication and that helped her get back to normal. Not in classes this semester, restarting in January, would like to consider another med as she does have significant issues with focus and concentration in school and when studying. Psychiatry note reviewed. ADHD dx confirmed. No h/o substance abuse or use.  verified.    Current moderate episode of major depressive disorder without prior episode (HCC)  Taking prozac only when she feels really bad, was confused about the instructions, not really feeling very depressed right now but would like to see a therapist    Amenorrhea  Irregular periods - possible PCOS,  "currently on Depo, not sexually active    Obesity (BMI 30-39.9)  Working on diet and exercise. Exercise is difficult d/t large breast. Goal 135. Discussed stopping Depo as it is known to cause weight gain    Acne vulgaris  Chronic, mostly cheeks, otc products not helping, would like to try prescription topical      ROS   Denies any recent fevers or chills. No nausea or vomiting. No chest pains or shortness of breath.     No Known Allergies    Current medicines (including changes today)  Current Outpatient Medications   Medication Sig Dispense Refill   • loratadine (CLARITIN) 10 MG Tab Take 10 mg by mouth every day.     • amphetamine-dextroamphetamine (ADDERALL) 5 MG Tab Take 1 Tab by mouth every morning for 30 days. 30 Tab 0   • benzoyl peroxide-erythromycin (BENZAMYCIN) gel Apply 1 Application to affected area(s) every day. 30 g 3     No current facility-administered medications for this visit.        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 She also has no past medical history of ASTHMA or Diabetes.  She  has no past surgical history on file.       Objective:   Temp 36.3 °C (97.3 °F) (Temporal)   Ht 1.499 m (4' 11.02\")   Wt 68.5 kg (151 lb)   LMP 08/06/2020 Comment: injection  BMI 30.48 kg/m²     Physical Exam:  Constitutional: Alert, no distress, well-groomed.  Skin: No rashes in visible areas.  Eye: Round. Conjunctiva clear, lids " normal. No icterus.   ENMT: Lips pink without lesions, good dentition, moist mucous membranes. Phonation normal.  Neck: No masses, no thyromegaly. Moves freely without pain.  Respiratory: Unlabored respiratory effort, no cough or audible wheeze  Psych: Alert and oriented x3, normal affect and mood.       Assessment and Plan:   The following treatment plan was discussed:     1. Large breasts    2. Irritable bowel syndrome with diarrhea-suspected    3. ADHD (attention deficit hyperactivity disorder), combined type  - amphetamine-dextroamphetamine (ADDERALL) 5 MG Tab; Take 1 Tab by mouth every morning for 30 days.  Dispense: 30 Tab; Refill: 0    4. Current moderate episode of major depressive disorder without prior episode (HCC)    5. Acne vulgaris  - benzoyl peroxide-erythromycin (BENZAMYCIN) gel; Apply 1 Application to affected area(s) every day.  Dispense: 30 g; Refill: 3    6. Amenorrhea    7. Obesity (BMI 30-39.9)    Other orders  - loratadine (CLARITIN) 10 MG Tab; Take 10 mg by mouth every day.        Follow-up: one month

## 2020-11-09 NOTE — ASSESSMENT & PLAN NOTE
Working on diet and exercise. Exercise is difficult d/t large breast. Goal 135. Discussed stopping Depo as it is known to cause weight gain

## 2020-11-17 ENCOUNTER — HOSPITAL ENCOUNTER (OUTPATIENT)
Dept: LAB | Facility: MEDICAL CENTER | Age: 20
End: 2020-11-17
Payer: COMMERCIAL

## 2020-11-17 LAB — COVID ORDER STATUS COVID19: NORMAL

## 2020-11-18 LAB
SARS-COV-2 RNA RESP QL NAA+PROBE: DETECTED
SPECIMEN SOURCE: ABNORMAL

## 2021-01-25 ENCOUNTER — OFFICE VISIT (OUTPATIENT)
Dept: MEDICAL GROUP | Facility: MEDICAL CENTER | Age: 21
End: 2021-01-25
Payer: COMMERCIAL

## 2021-01-25 VITALS
TEMPERATURE: 98.4 F | HEIGHT: 59 IN | WEIGHT: 153 LBS | OXYGEN SATURATION: 96 % | SYSTOLIC BLOOD PRESSURE: 104 MMHG | BODY MASS INDEX: 30.84 KG/M2 | DIASTOLIC BLOOD PRESSURE: 62 MMHG | HEART RATE: 95 BPM

## 2021-01-25 DIAGNOSIS — F41.9 ANXIETY AND DEPRESSION: ICD-10-CM

## 2021-01-25 DIAGNOSIS — Z30.09 BIRTH CONTROL COUNSELING: ICD-10-CM

## 2021-01-25 DIAGNOSIS — F32.A ANXIETY AND DEPRESSION: ICD-10-CM

## 2021-01-25 DIAGNOSIS — F32.1 CURRENT MODERATE EPISODE OF MAJOR DEPRESSIVE DISORDER WITHOUT PRIOR EPISODE (HCC): ICD-10-CM

## 2021-01-25 DIAGNOSIS — F90.2 ADHD (ATTENTION DEFICIT HYPERACTIVITY DISORDER), COMBINED TYPE: ICD-10-CM

## 2021-01-25 LAB
INT CON NEG: NEGATIVE
INT CON POS: POSITIVE
POC URINE PREGNANCY TEST: NEGATIVE

## 2021-01-25 PROCEDURE — 81025 URINE PREGNANCY TEST: CPT | Performed by: PHYSICIAN ASSISTANT

## 2021-01-25 PROCEDURE — 99213 OFFICE O/P EST LOW 20 MIN: CPT | Mod: 25 | Performed by: PHYSICIAN ASSISTANT

## 2021-01-25 PROCEDURE — 96372 THER/PROPH/DIAG INJ SC/IM: CPT | Performed by: PHYSICIAN ASSISTANT

## 2021-01-25 RX ORDER — DEXTROAMPHETAMINE SACCHARATE, AMPHETAMINE ASPARTATE MONOHYDRATE, DEXTROAMPHETAMINE SULFATE AND AMPHETAMINE SULFATE 1.25; 1.25; 1.25; 1.25 MG/1; MG/1; MG/1; MG/1
5 CAPSULE, EXTENDED RELEASE ORAL EVERY MORNING
COMMUNITY
End: 2021-05-28

## 2021-01-25 RX ORDER — MEDROXYPROGESTERONE ACETATE 150 MG/ML
150 INJECTION, SUSPENSION INTRAMUSCULAR ONCE
Status: COMPLETED | OUTPATIENT
Start: 2021-01-25 | End: 2021-01-25

## 2021-01-25 RX ORDER — FLUOXETINE HYDROCHLORIDE 20 MG/1
20 CAPSULE ORAL DAILY
COMMUNITY
End: 2021-05-28

## 2021-01-25 RX ADMIN — MEDROXYPROGESTERONE ACETATE 150 MG: 150 INJECTION, SUSPENSION INTRAMUSCULAR at 14:08

## 2021-01-25 ASSESSMENT — FIBROSIS 4 INDEX: FIB4 SCORE: 0.27

## 2021-01-25 NOTE — PROGRESS NOTES
"Subjective:   Eda Rothman is a 20 y.o. female here today for birth control    Birth control counseling  Has been on depo in the past. Last depo shot October. Has only been on Depo in the past. Started in High School. Now sexually active for one week. States she is using condoms.    ADHD (attention deficit hyperactivity disorder), combined type  Chronic, would like to see a new psychiatrist, needs referral    Current moderate episode of major depressive disorder without prior episode (HCC)  Taking lexapro, would like to see a new psychiatrist and new therapist       Current medicines (including changes today)  Current Outpatient Medications   Medication Sig Dispense Refill   • FLUoxetine (PROZAC) 20 MG Cap Take 20 mg by mouth every day.     • amphetamine-dextroamphetamine (ADDERALL XR, 5MG,) 5 MG XR capsule Take 5 mg by mouth every morning.     • loratadine (CLARITIN) 10 MG Tab Take 10 mg by mouth every day.     • benzoyl peroxide-erythromycin (BENZAMYCIN) gel Apply 1 Application to affected area(s) every day. 30 g 3     Current Facility-Administered Medications   Medication Dose Route Frequency Provider Last Rate Last Admin   • medroxyPROGESTERone (DEPO-PROVERA) injection 150 mg  150 mg Intramuscular Once Izabella Chapman P.A.-C.         She  has a past medical history of ADHD. She also has no past medical history of ASTHMA or Diabetes.    ROS   No fever/chills. No weight change. No headache/dizziness. No focal weakness. No sore throat, nasal congestion, ear pain. No chest pain, no shortness of breath, difficulty breathing. No n/v/d/c or abdominal pain. No urinary complaint. No rash or skin lesion. No joint pain or swelling.       Objective:     /62 (BP Location: Left arm, Patient Position: Sitting, BP Cuff Size: Adult)   Pulse 95   Temp 36.9 °C (98.4 °F) (Temporal)   Ht 1.499 m (4' 11.02\")   Wt 69.4 kg (153 lb)   SpO2 96%  Body mass index is 30.89 kg/m².   Physical Exam:  Constitutional: WDWN, " NAD  Skin: Warm, dry, good turgor, no rashes in visible areas.  Psych: Alert and oriented x3, normal affect and mood.    Assessment and Plan:   The following treatment plan was discussed    1. ADHD (attention deficit hyperactivity disorder), combined type    - REFERRAL TO BEHAVIORAL HEALTH    2. Birth control counseling    - medroxyPROGESTERone (DEPO-PROVERA) injection 150 mg  - POCT Pregnancy    3. Anxiety and depression    - REFERRAL TO BEHAVIORAL HEALTH    4. Current moderate episode of major depressive disorder without prior episode (HCC)        Followup: 3 month

## 2021-01-25 NOTE — ASSESSMENT & PLAN NOTE
Has been on depo in the past. Last depo shot October. Has only been on Depo in the past. Started in High School. Now sexually active for one week. States she is using condoms.

## 2021-02-06 ENCOUNTER — OFFICE VISIT (OUTPATIENT)
Dept: URGENT CARE | Facility: CLINIC | Age: 21
End: 2021-02-06
Payer: COMMERCIAL

## 2021-02-06 VITALS
BODY MASS INDEX: 32.25 KG/M2 | OXYGEN SATURATION: 97 % | TEMPERATURE: 97.9 F | HEART RATE: 90 BPM | HEIGHT: 59 IN | SYSTOLIC BLOOD PRESSURE: 120 MMHG | DIASTOLIC BLOOD PRESSURE: 72 MMHG | RESPIRATION RATE: 14 BRPM | WEIGHT: 160 LBS

## 2021-02-06 DIAGNOSIS — T50.Z95A ADVERSE EFFECT OF VACCINE, INITIAL ENCOUNTER: ICD-10-CM

## 2021-02-06 DIAGNOSIS — R10.9 ABDOMINAL PAIN, UNSPECIFIED ABDOMINAL LOCATION: ICD-10-CM

## 2021-02-06 LAB
APPEARANCE UR: CLEAR
BILIRUB UR STRIP-MCNC: NEGATIVE MG/DL
COLOR UR AUTO: YELLOW
GLUCOSE UR STRIP.AUTO-MCNC: NEGATIVE MG/DL
INT CON NEG: NORMAL
INT CON POS: NORMAL
KETONES UR STRIP.AUTO-MCNC: NEGATIVE MG/DL
LEUKOCYTE ESTERASE UR QL STRIP.AUTO: NEGATIVE
NITRITE UR QL STRIP.AUTO: NEGATIVE
PH UR STRIP.AUTO: 6 [PH] (ref 5–8)
POC URINE PREGNANCY TEST: NEGATIVE
PROT UR QL STRIP: NEGATIVE MG/DL
RBC UR QL AUTO: NORMAL
SP GR UR STRIP.AUTO: >=1.03
UROBILINOGEN UR STRIP-MCNC: 0.2 MG/DL

## 2021-02-06 PROCEDURE — 99214 OFFICE O/P EST MOD 30 MIN: CPT | Performed by: PHYSICIAN ASSISTANT

## 2021-02-06 PROCEDURE — 81025 URINE PREGNANCY TEST: CPT | Performed by: PHYSICIAN ASSISTANT

## 2021-02-06 PROCEDURE — 81002 URINALYSIS NONAUTO W/O SCOPE: CPT | Performed by: PHYSICIAN ASSISTANT

## 2021-02-06 RX ORDER — ONDANSETRON 4 MG/1
4 TABLET, FILM COATED ORAL EVERY 4 HOURS PRN
Qty: 30 TAB | Refills: 0 | Status: SHIPPED | OUTPATIENT
Start: 2021-02-06 | End: 2021-05-28

## 2021-02-06 RX ORDER — ONDANSETRON 4 MG/1
4 TABLET, ORALLY DISINTEGRATING ORAL ONCE
Status: COMPLETED | OUTPATIENT
Start: 2021-02-06 | End: 2021-02-06

## 2021-02-06 RX ADMIN — ONDANSETRON 4 MG: 4 TABLET, ORALLY DISINTEGRATING ORAL at 16:04

## 2021-02-06 ASSESSMENT — FIBROSIS 4 INDEX: FIB4 SCORE: 0.27

## 2021-02-08 ASSESSMENT — ENCOUNTER SYMPTOMS
DIARRHEA: 1
NAUSEA: 1
COUGH: 0
FATIGUE: 0
CHILLS: 0
VOMITING: 1
DIZZINESS: 0
SHORTNESS OF BREATH: 0
ABDOMINAL PAIN: 0
MUSCULOSKELETAL NEGATIVE: 1
CONSTIPATION: 0
SORE THROAT: 0
CHANGE IN BOWEL HABIT: 1
FEVER: 0
BLOOD IN STOOL: 0

## 2021-02-09 NOTE — PROGRESS NOTES
"Subjective:      Eda Rothman is a 20 y.o. female who presents with Nausea (vomiting, diarrhea, light headed, nausea, abd pain x 2 days)            Nausea  This is a new problem. The current episode started in the past 7 days (2 days). The problem occurs constantly. The problem has been unchanged. Associated symptoms include a change in bowel habit, nausea and vomiting. Pertinent negatives include no abdominal pain, chest pain, chills, congestion, coughing, fatigue, fever, rash or sore throat. Nothing aggravates the symptoms. She has tried nothing for the symptoms.     Patient presents to urgent care reporting nausea, vomiting, diarrhea, abdominal cramping and lightheadedness starting yesterday. She received her second dose of the moderna covid-19 vaccine 2 days ago. No fevers, facial swelling, throat tightness, rashes, or SOB. No concern for pregnancy.       Review of Systems   Constitutional: Negative for chills, fatigue and fever.   HENT: Negative for congestion, ear pain and sore throat.    Respiratory: Negative for cough and shortness of breath.    Cardiovascular: Negative for chest pain.   Gastrointestinal: Positive for change in bowel habit, diarrhea, nausea and vomiting. Negative for abdominal pain, blood in stool and constipation.   Genitourinary: Negative.    Musculoskeletal: Negative.    Skin: Negative for rash.   Neurological: Negative for dizziness.        Objective:     /72 (BP Location: Left arm, Patient Position: Sitting, BP Cuff Size: Adult)   Pulse 90   Temp 36.6 °C (97.9 °F) (Temporal)   Resp 14   Ht 1.499 m (4' 11\")   Wt 72.6 kg (160 lb)   SpO2 97%   BMI 32.32 kg/m²        Physical Exam  Vitals signs and nursing note reviewed.   Constitutional:       General: She is not in acute distress.     Appearance: Normal appearance. She is well-developed. She is not diaphoretic.   HENT:      Head: Normocephalic and atraumatic.      Right Ear: External ear normal.      Left Ear: External ear " normal.   Eyes:      Conjunctiva/sclera: Conjunctivae normal.   Neck:      Musculoskeletal: Normal range of motion.   Cardiovascular:      Rate and Rhythm: Normal rate.   Pulmonary:      Effort: Pulmonary effort is normal.   Abdominal:      General: Abdomen is flat. Bowel sounds are normal. There is no distension.      Tenderness: There is no abdominal tenderness. There is no right CVA tenderness, left CVA tenderness or guarding.   Musculoskeletal: Normal range of motion.   Skin:     General: Skin is warm and dry.      Findings: No rash.   Neurological:      Mental Status: She is alert and oriented to person, place, and time.   Psychiatric:         Behavior: Behavior normal.          PMH:  has a past medical history of ADHD. She also has no past medical history of ASTHMA or Diabetes.  MEDS:   Current Outpatient Medications:   •  ondansetron (ZOFRAN) 4 MG Tab tablet, Take 1 Tab by mouth every four hours as needed for Nausea/Vomiting., Disp: 30 Tab, Rfl: 0  •  FLUoxetine (PROZAC) 20 MG Cap, Take 20 mg by mouth every day., Disp: , Rfl:   •  amphetamine-dextroamphetamine (ADDERALL XR, 5MG,) 5 MG XR capsule, Take 5 mg by mouth every morning., Disp: , Rfl:   •  loratadine (CLARITIN) 10 MG Tab, Take 10 mg by mouth every day., Disp: , Rfl:   •  benzoyl peroxide-erythromycin (BENZAMYCIN) gel, Apply 1 Application to affected area(s) every day., Disp: 30 g, Rfl: 3  ALLERGIES: No Known Allergies  SURGHX: History reviewed. No pertinent surgical history.  SOCHX:  reports that she has never smoked. She has never used smokeless tobacco. She reports previous alcohol use. She reports previous drug use.  FH: family history includes Cancer in her maternal grandmother.      POCT Urinalysis:  Ref Range & Units 2d ago    POC Color Negative yellow    POC Appearance Negative clear    POC Leukocyte Esterase Negative negative    POC Nitrites Negative negative    POC Urobiligen Negative (0.2) mg/dL 0.2    POC Protein Negative mg/dL negative     POC Urine PH 5.0 - 8.0 6.0    POC Blood Negative trace - intact    POC Specific Gravity <1.005 - >1.030 >=1.030    POC Ketones Negative mg/dL negative    POC Bilirubin Negative mg/dL negative    POC Glucose Negative mg/dL negative         Assessment/Plan:        1. Abdominal pain, unspecified abdominal location    - POCT Urinalysis --> normal   - POCT Pregnancy --> negative     2. Adverse effect of vaccine, initial encounter    - ondansetron (ZOFRAN ODT) dispertab 4 mg   - given in urgent care prior to discharge with good relief of nausea  - ondansetron (ZOFRAN) 4 MG Tab tablet; Take 1 Tab by mouth every four hours as needed for Nausea/Vomiting.  Dispense: 30 Tab; Refill: 0    Encouraged use of Zofran every 4 hours as needed for nausea/vomiting. Encouraged increased fluids and rest. BRAT diet discussed. Call or return to office if symptoms persist or worsen. Work note provided. The patient demonstrated a good understanding and agreed with the treatment plan.

## 2021-02-18 ENCOUNTER — TELEMEDICINE (OUTPATIENT)
Dept: BEHAVIORAL HEALTH | Facility: CLINIC | Age: 21
End: 2021-02-18
Payer: COMMERCIAL

## 2021-02-18 DIAGNOSIS — F41.1 ANXIETY AS ACUTE REACTION TO EXCEPTIONAL STRESS: ICD-10-CM

## 2021-02-18 DIAGNOSIS — G25.69 TICS OF ORGANIC ORIGIN: ICD-10-CM

## 2021-02-18 DIAGNOSIS — F43.23 ADJUSTMENT DISORDER WITH MIXED ANXIETY AND DEPRESSED MOOD: ICD-10-CM

## 2021-02-18 DIAGNOSIS — F43.0 ANXIETY AS ACUTE REACTION TO EXCEPTIONAL STRESS: ICD-10-CM

## 2021-02-18 DIAGNOSIS — F32.1 CURRENT MODERATE EPISODE OF MAJOR DEPRESSIVE DISORDER WITHOUT PRIOR EPISODE (HCC): ICD-10-CM

## 2021-02-18 PROCEDURE — 90791 PSYCH DIAGNOSTIC EVALUATION: CPT | Performed by: MARRIAGE & FAMILY THERAPIST

## 2021-03-29 ENCOUNTER — TELEMEDICINE (OUTPATIENT)
Dept: BEHAVIORAL HEALTH | Facility: CLINIC | Age: 21
End: 2021-03-29
Payer: COMMERCIAL

## 2021-03-29 DIAGNOSIS — F43.0 ANXIETY AS ACUTE REACTION TO EXCEPTIONAL STRESS: ICD-10-CM

## 2021-03-29 DIAGNOSIS — F41.1 ANXIETY AS ACUTE REACTION TO EXCEPTIONAL STRESS: ICD-10-CM

## 2021-03-29 DIAGNOSIS — F32.1 CURRENT MODERATE EPISODE OF MAJOR DEPRESSIVE DISORDER WITHOUT PRIOR EPISODE (HCC): ICD-10-CM

## 2021-03-29 DIAGNOSIS — F90.2 ADHD (ATTENTION DEFICIT HYPERACTIVITY DISORDER), COMBINED TYPE: ICD-10-CM

## 2021-03-29 DIAGNOSIS — F43.23 ADJUSTMENT DISORDER WITH MIXED ANXIETY AND DEPRESSED MOOD: ICD-10-CM

## 2021-03-29 PROCEDURE — 90834 PSYTX W PT 45 MINUTES: CPT | Mod: 95,CR | Performed by: MARRIAGE & FAMILY THERAPIST

## 2021-03-29 NOTE — BH THERAPY
[facility]  Therapy Progress Note      This evaluation was conducted via Zoom using secure and encrypted videoconferencing technology. The patient was in a private location in the state of [place].    The patient's identity was confirmed and verbal consent was obtained for this virtual visit.     Patient Name: Eda Rothman  Patient MRN: [id number]  Today's Date: 3/29/2021     Type of session:Individual psychotherapy  Length of session: 45 minutes  Persons in attendance:Patient    Subjective/New Info: Ms. Veras reports that she is doing well today. She also openly reports that she recently broke up with her BF bz she noticed he has anger issues; with insight that she grew up with a Dad who gets angry easily, so she was able to see the red levi to avoid men/realtionship with issues of anger.    She also reports that she has been thinking wanting to research going into [employer] instead of law school. 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 Goals to talk to academic counselor and look into the website for requirements for [employer] academy. She also was able to cultural heritage is ; and raised by  Father.     She also will be going forward with the surgery in June. She reports open to spiritual journey and learning to God for peace and prayers of understanding. Psychoeducation on stress management goals/lifestyle transformation goals.     Objective/Observations:   Participation: Active verbal participation and Open to feedback   Grooming: Casual   Cognition: Fully Oriented   Eye contact: Good   Mood: Depressed and Anxious   Affect: Full range and Congruent with content   Thought process: Logical   Speech: Rate within normal limits   Other:     Diagnoses:   1. ADHD (attention deficit hyperactivity disorder), combined type    2. Adjustment disorder with mixed anxiety and depressed mood    3. Anxiety as acute reaction to exceptional stress    4. Current  moderate episode of major depressive disorder without prior episode (HCC)         Current risk:   SUICIDE: Not applicable   Homicide: Not applicable   Self-harm: Not applicable   Relapse: Not applicable   Other:    Safety Plan reviewed? Not Indicated   If evidence of imminent risk is present, intervention/plan:     Therapeutic Intervention(s): Cognitive modification, Conflict resolution skills, Establish rapport, Goal-setting, Interpersonal effectiveness skills, Leisure and recreation skills, Play therapy, Positive behavior reinforced, Problem-solving, Self-care skills, Stressors assessed and Supportive psychotherapy    Treatment Goal(s)/Objective(s) addressed:   Goals for Stress Management Skill & Healthy Life style changes  1. Positive/Spiritual Affirmations/Reading the Bible/scriptures   2. Movement/Shifting focus and change of scenery with healthy distractions/activities for about   3 to 5 minutes.   3. Relaxation techniques (music therapy, movie or show uplifting, deep breathing skills); mindful meditation honey store (deep breathing and relaxation skills) and youtube deep breathing & guided imagery relaxation techniques/self care activities/hobbies  4. Time-management skills/prioritizing tasks/roles/relationships  5. Health & Fitness goal  6. Journal thoughts and feelings/counting daily blessings before bedtime (changing a negative thought/feeling to a positive thought/feeling by finding the silver lining).    Progress toward Treatment Goals: Mild improvement    Plan:  - Patient is in agreement with the above plan:  YES    RAINE Nair  3/29/2021

## 2021-04-01 ENCOUNTER — APPOINTMENT (OUTPATIENT)
Dept: RADIOLOGY | Facility: IMAGING CENTER | Age: 21
End: 2021-04-01
Attending: NURSE PRACTITIONER
Payer: COMMERCIAL

## 2021-04-01 ENCOUNTER — OFFICE VISIT (OUTPATIENT)
Dept: URGENT CARE | Facility: CLINIC | Age: 21
End: 2021-04-01
Payer: COMMERCIAL

## 2021-04-01 VITALS
SYSTOLIC BLOOD PRESSURE: 110 MMHG | TEMPERATURE: 98 F | WEIGHT: 170 LBS | RESPIRATION RATE: 16 BRPM | HEIGHT: 59 IN | BODY MASS INDEX: 34.27 KG/M2 | DIASTOLIC BLOOD PRESSURE: 80 MMHG | HEART RATE: 94 BPM | OXYGEN SATURATION: 96 %

## 2021-04-01 DIAGNOSIS — M54.6 ACUTE MIDLINE THORACIC BACK PAIN: ICD-10-CM

## 2021-04-01 DIAGNOSIS — S13.4XXA WHIPLASH INJURY TO NECK, INITIAL ENCOUNTER: ICD-10-CM

## 2021-04-01 DIAGNOSIS — M54.2 CERVICAL PAIN: ICD-10-CM

## 2021-04-01 DIAGNOSIS — M79.645 PAIN OF LEFT THUMB: ICD-10-CM

## 2021-04-01 DIAGNOSIS — R51.9 NONINTRACTABLE HEADACHE, UNSPECIFIED CHRONICITY PATTERN, UNSPECIFIED HEADACHE TYPE: ICD-10-CM
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
## 2021-04-01 PROCEDURE — 73140 X-RAY EXAM OF FINGER(S): CPT | Mod: TC,LT | Performed by: NURSE PRACTITIONER

## 2021-04-01 PROCEDURE — 72040 X-RAY EXAM NECK SPINE 2-3 VW: CPT | Mod: TC | Performed by: NURSE PRACTITIONER

## 2021-04-01 PROCEDURE — 99214 OFFICE O/P EST MOD 30 MIN: CPT | Mod: 25 | Performed by: NURSE PRACTITIONER

## 2021-04-01 PROCEDURE — 72070 X-RAY EXAM THORAC SPINE 2VWS: CPT | Mod: TC | Performed by: NURSE PRACTITIONER

## 2021-04-01 PROCEDURE — 96372 THER/PROPH/DIAG INJ SC/IM: CPT | Performed by: NURSE PRACTITIONER

## 2021-04-01 RX ORDER — KETOROLAC TROMETHAMINE 30 MG/ML
15 INJECTION, SOLUTION INTRAMUSCULAR; INTRAVENOUS ONCE
Status: COMPLETED | OUTPATIENT
Start: 2021-04-01 | End: 2021-04-01

## 2021-04-01 RX ORDER — MEDROXYPROGESTERONE ACETATE 150 MG/ML
150 INJECTION, SUSPENSION INTRAMUSCULAR ONCE
COMMUNITY
End: 2021-05-28

## 2021-04-01 RX ORDER — KETOROLAC TROMETHAMINE 30 MG/ML
30 INJECTION, SOLUTION INTRAMUSCULAR; INTRAVENOUS ONCE
Status: DISCONTINUED | OUTPATIENT
Start: 2021-04-01 | End: 2021-04-01

## 2021-04-01 RX ADMIN — KETOROLAC TROMETHAMINE 15 MG: 30 INJECTION, SOLUTION INTRAMUSCULAR; INTRAVENOUS at 14:48

## 2021-04-01 ASSESSMENT — ENCOUNTER SYMPTOMS
SORE THROAT: 0
FEVER: 0
COUGH: 0
MYALGIAS: 1
NAUSEA: 0
HEADACHES: 1
EYE PAIN: 0
FATIGUE: 0
NECK PAIN: 1
DIZZINESS: 0
VERTIGO: 0
BACK PAIN: 1
SHORTNESS OF BREATH: 0
VOMITING: 0
CHILLS: 0

## 2021-04-01 ASSESSMENT — FIBROSIS 4 INDEX: FIB4 SCORE: 0.27
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
## 2021-04-04 ENCOUNTER — TELEPHONE (OUTPATIENT)
Dept: URGENT CARE | Facility: CLINIC | Age: 21
End: 2021-04-04

## 2021-04-04 NOTE — TELEPHONE ENCOUNTER
Deuce GUTIERREZ,    The patient called in regards to her urgent care visit from 4/1/2021 and requesting a note to be excused from work work 2 weeks. The patient would like the note for 04/04/2021-04/18/2021. If you have any questions, please call the patient or reply via [email or web address] Gely    Patient's contact number:  Tel: [phone]

## 2021-04-05 DIAGNOSIS — L70.0 ACNE VULGARIS: ICD-10-CM

## 2021-04-05 RX ORDER — ERYTHROMYCIN AND BENZOYL PEROXIDE 30; 50 MG/G; MG/G
1 GEL TOPICAL DAILY
Qty: 30 G | Refills: 3 | Status: SHIPPED | OUTPATIENT
Start: 2021-04-05 | End: 2021-05-28

## 2021-04-13 ENCOUNTER — OFFICE VISIT (OUTPATIENT)
Dept: MEDICAL GROUP | Facility: CLINIC | Age: 21
End: 2021-04-13
Payer: COMMERCIAL

## 2021-04-13 VITALS
RESPIRATION RATE: 16 BRPM | BODY MASS INDEX: 34.27 KG/M2 | DIASTOLIC BLOOD PRESSURE: 80 MMHG | WEIGHT: 170 LBS | SYSTOLIC BLOOD PRESSURE: 118 MMHG | OXYGEN SATURATION: 96 % | HEIGHT: 59 IN | TEMPERATURE: 98.3 F | HEART RATE: 97 BPM

## 2021-04-13 DIAGNOSIS — S06.0X1A CONCUSSION WITH LOSS OF CONSCIOUSNESS OF 30 MINUTES OR LESS, INITIAL ENCOUNTER: ICD-10-CM

## 2021-04-13 DIAGNOSIS — M54.2 CERVICALGIA: ICD-10-CM

## 2021-04-13 DIAGNOSIS — H53.9 VISION DISTURBANCE: ICD-10-CM

## 2021-04-13 PROCEDURE — 99204 OFFICE O/P NEW MOD 45 MIN: CPT | Performed by: FAMILY MEDICINE

## 2021-04-13 ASSESSMENT — FIBROSIS 4 INDEX: FIB4 SCORE: 0.27
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
## 2021-04-13 NOTE — Clinical Note
Tevin Tripathi,  Thanks for referring Eda to our sports medicine clinic.  We are going to have her work on balance through vestibular therapy and have her get her eyes checked for blurred vision.  We will see her back in 2 weeks for a re-check.    Thanks,  MICHAELA

## 2021-04-14 ENCOUNTER — TELEPHONE (OUTPATIENT)
Dept: MEDICAL GROUP | Facility: CLINIC | Age: 21
End: 2021-04-14
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
## 2021-04-15 ENCOUNTER — TELEPHONE (OUTPATIENT)
Dept: MEDICAL GROUP | Facility: CLINIC | Age: 21
End: 2021-04-15

## 2021-04-15 DIAGNOSIS — S06.0X1A CONCUSSION WITH LOSS OF CONSCIOUSNESS OF 30 MINUTES OR LESS, INITIAL ENCOUNTER: ICD-10-CM
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
## 2021-04-27 ENCOUNTER — OFFICE VISIT (OUTPATIENT)
Dept: MEDICAL GROUP | Facility: CLINIC | Age: 21
End: 2021-04-27
Payer: COMMERCIAL

## 2021-04-27 VITALS
RESPIRATION RATE: 16 BRPM | BODY MASS INDEX: 34.27 KG/M2 | TEMPERATURE: 98.3 F | OXYGEN SATURATION: 97 % | HEIGHT: 59 IN | SYSTOLIC BLOOD PRESSURE: 110 MMHG | HEART RATE: 82 BPM | WEIGHT: 170 LBS | DIASTOLIC BLOOD PRESSURE: 70 MMHG

## 2021-04-27 DIAGNOSIS — H53.9 VISION DISTURBANCE: ICD-10-CM

## 2021-04-27 DIAGNOSIS — S06.0X1A CONCUSSION WITH LOSS OF CONSCIOUSNESS OF 30 MINUTES OR LESS, INITIAL ENCOUNTER: ICD-10-CM

## 2021-04-27 DIAGNOSIS — M54.2 CERVICALGIA: ICD-10-CM

## 2021-04-27 PROCEDURE — 99214 OFFICE O/P EST MOD 30 MIN: CPT | Performed by: FAMILY MEDICINE

## 2021-04-27 ASSESSMENT — FIBROSIS 4 INDEX: FIB4 SCORE: 0.27
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
## 2021-05-27 ENCOUNTER — HOSPITAL ENCOUNTER (OUTPATIENT)
Facility: MEDICAL CENTER | Age: 21
End: 2021-05-27
Attending: PHYSICIAN ASSISTANT
Payer: COMMERCIAL

## 2021-05-27 ENCOUNTER — OFFICE VISIT (OUTPATIENT)
Dept: MEDICAL GROUP | Facility: MEDICAL CENTER | Age: 21
End: 2021-05-27
Payer: COMMERCIAL

## 2021-05-27 VITALS
TEMPERATURE: 98 F | WEIGHT: 166.4 LBS | BODY MASS INDEX: 33.55 KG/M2 | SYSTOLIC BLOOD PRESSURE: 108 MMHG | DIASTOLIC BLOOD PRESSURE: 72 MMHG | OXYGEN SATURATION: 95 % | HEIGHT: 59 IN | HEART RATE: 86 BPM

## 2021-05-27 DIAGNOSIS — R30.0 DYSURIA: ICD-10-CM

## 2021-05-27 DIAGNOSIS — Z30.42 ENCOUNTER FOR DEPO-PROVERA CONTRACEPTION: ICD-10-CM

## 2021-05-27 DIAGNOSIS — N62 LARGE BREASTS: ICD-10-CM

## 2021-05-27 LAB
APPEARANCE UR: NORMAL
BILIRUB UR STRIP-MCNC: NEGATIVE MG/DL
COLOR UR AUTO: NORMAL
GLUCOSE UR STRIP.AUTO-MCNC: NEGATIVE MG/DL
INT CON NEG: NEGATIVE
INT CON POS: POSITIVE
KETONES UR STRIP.AUTO-MCNC: NEGATIVE MG/DL
LEUKOCYTE ESTERASE UR QL STRIP.AUTO: NEGATIVE
NITRITE UR QL STRIP.AUTO: NEGATIVE
PH UR STRIP.AUTO: 6 [PH] (ref 5–8)
POC URINE PREGNANCY TEST: NEGATIVE
PROT UR QL STRIP: NEGATIVE MG/DL
RBC UR QL AUTO: NEGATIVE
SP GR UR STRIP.AUTO: 1.02
UROBILINOGEN UR STRIP-MCNC: 0.2 MG/DL

## 2021-05-27 PROCEDURE — 96372 THER/PROPH/DIAG INJ SC/IM: CPT | Performed by: PHYSICIAN ASSISTANT

## 2021-05-27 PROCEDURE — 99214 OFFICE O/P EST MOD 30 MIN: CPT | Mod: 25 | Performed by: PHYSICIAN ASSISTANT

## 2021-05-27 PROCEDURE — 87086 URINE CULTURE/COLONY COUNT: CPT

## 2021-05-27 PROCEDURE — 81025 URINE PREGNANCY TEST: CPT | Performed by: PHYSICIAN ASSISTANT

## 2021-05-27 PROCEDURE — 81002 URINALYSIS NONAUTO W/O SCOPE: CPT | Performed by: PHYSICIAN ASSISTANT

## 2021-05-27 PROCEDURE — 87186 SC STD MICRODIL/AGAR DIL: CPT

## 2021-05-27 PROCEDURE — 99000 SPECIMEN HANDLING OFFICE-LAB: CPT | Performed by: PHYSICIAN ASSISTANT

## 2021-05-27 PROCEDURE — 87077 CULTURE AEROBIC IDENTIFY: CPT

## 2021-05-27 RX ORDER — MEDROXYPROGESTERONE ACETATE 150 MG/ML
150 INJECTION, SUSPENSION INTRAMUSCULAR ONCE
Status: COMPLETED | OUTPATIENT
Start: 2021-05-27 | End: 2021-05-27

## 2021-05-27 RX ADMIN — MEDROXYPROGESTERONE ACETATE 150 MG: 150 INJECTION, SUSPENSION INTRAMUSCULAR at 11:16

## 2021-05-27 ASSESSMENT — PATIENT HEALTH QUESTIONNAIRE - PHQ9
SUM OF ALL RESPONSES TO PHQ9 QUESTIONS 1 AND 2: 0
4. FEELING TIRED OR HAVING LITTLE ENERGY: SEVERAL DAYS
7. TROUBLE CONCENTRATING ON THINGS, SUCH AS READING THE NEWSPAPER OR WATCHING TELEVISION: NEARLY EVERY DAY
3. TROUBLE FALLING OR STAYING ASLEEP OR SLEEPING TOO MUCH: NOT AT ALL
SUM OF ALL RESPONSES TO PHQ QUESTIONS 1-9: 10
2. FEELING DOWN, DEPRESSED, IRRITABLE, OR HOPELESS: NOT AT ALL
9. THOUGHTS THAT YOU WOULD BE BETTER OFF DEAD, OR OF HURTING YOURSELF: NOT AT ALL
1. LITTLE INTEREST OR PLEASURE IN DOING THINGS: NOT AT ALL
5. POOR APPETITE OR OVEREATING: NEARLY EVERY DAY
8. MOVING OR SPEAKING SO SLOWLY THAT OTHER PEOPLE COULD HAVE NOTICED. OR THE OPPOSITE, BEING SO FIGETY OR RESTLESS THAT YOU HAVE BEEN MOVING AROUND A LOT MORE THAN USUAL: NOT AT ALL
6. FEELING BAD ABOUT YOURSELF - OR THAT YOU ARE A FAILURE OR HAVE LET YOURSELF OR YOUR FAMILY DOWN: NEARLY EVERY DAY

## 2021-05-27 ASSESSMENT — FIBROSIS 4 INDEX: FIB4 SCORE: 0.27

## 2021-05-28 ENCOUNTER — APPOINTMENT (OUTPATIENT)
Dept: PHYSICAL THERAPY | Facility: REHABILITATION | Age: 21
End: 2021-05-28
Payer: COMMERCIAL

## 2021-05-28 NOTE — PROGRESS NOTES
Subjective:      Eda Rothman is a 20 y.o. female who presents with Dysuria (Dark cloudy, tingle after urinating x x 2 days) and Injections (Depo)          Chief Complaint   Patient presents with   • Dysuria     Dark cloudy, tingle after urinating x x 2 days   • Injections     Depo       HPIPatient presents with concern of dysuria. Started 2-3 days ago after intercourse with condom. Getting a little better as she is drinking more water. No blood in urine but did notice urine was darker and cloudier than normal. No pelvic pain, vaginal discharge, flank pain. No prior UTI. No fever/chills. She is concerned because she is scheduled for breast reduction surgery next Wednesday and was told not to take any antibiotics 2 weeks prior to the surgery.    ROSno weight change. No fever/chills. No headache/dizziness. No neck pain or stiffness. No chest pain, SOB or difficulty breathing. No n/v/d/c or abdominal pain. No rash or skin lesion. No blood in urine.    Active Ambulatory Problems     Diagnosis Date Noted   • Amenorrhea 01/29/2020   • ADHD (attention deficit hyperactivity disorder), combined type 01/29/2020   • Obesity (BMI 30-39.9) 01/29/2020   • Current moderate episode of major depressive disorder without prior episode (HCC) 06/25/2020   • Primary focal hyperhidrosis of palms 06/25/2020   • Irritable bowel syndrome with diarrhea-suspected 06/25/2020   • Large breasts 11/06/2020   • Acne vulgaris 11/06/2020   • Birth control counseling 01/25/2021   • Adjustment disorder with mixed anxiety and depressed mood 02/18/2021   • Tics of organic origin 02/18/2021   • Anxiety as acute reaction to exceptional stress 02/18/2021     Resolved Ambulatory Problems     Diagnosis Date Noted   • No Resolved Ambulatory Problems     Past Medical History:   Diagnosis Date   • ADHD    • Allergy      Current Outpatient Medications   Medication Sig Dispense Refill   • benzoyl peroxide-erythromycin (BENZAMYCIN) gel Apply 1 Application  "topically every day. (Patient not taking: Reported on 5/27/2021) 30 g 3   • medroxyPROGESTERone (DEPO-PROVERA) 150 MG/ML Suspension Inject 150 mg into the shoulder, thigh, or buttocks one time. (Patient not taking: Reported on 5/27/2021)     • ondansetron (ZOFRAN) 4 MG Tab tablet Take 1 Tab by mouth every four hours as needed for Nausea/Vomiting. (Patient not taking: Reported on 4/1/2021) 30 Tab 0   • FLUoxetine (PROZAC) 20 MG Cap Take 20 mg by mouth every day. (Patient not taking: Reported on 5/27/2021)     • amphetamine-dextroamphetamine (ADDERALL XR, 5MG,) 5 MG XR capsule Take 5 mg by mouth every morning. (Patient not taking: Reported on 5/27/2021)     • loratadine (CLARITIN) 10 MG Tab Take 10 mg by mouth every day. (Patient not taking: Reported on 5/27/2021)       No current facility-administered medications for this visit.   nkda  Non-smoker   Objective:     /72 (BP Location: Left arm, Patient Position: Sitting, BP Cuff Size: Adult long)   Pulse 86   Temp 36.7 °C (98 °F) (Temporal)   Ht 1.499 m (4' 11.02\")   Wt 75.5 kg (166 lb 6.4 oz)   SpO2 95%   Breastfeeding No   BMI 33.59 kg/m²      Physical Exam  Vitals and nursing note reviewed.   Constitutional:       General: She is not in acute distress.     Appearance: Normal appearance. She is normal weight. She is not ill-appearing, toxic-appearing or diaphoretic.   Cardiovascular:      Rate and Rhythm: Normal rate and regular rhythm.   Pulmonary:      Effort: Pulmonary effort is normal.      Breath sounds: Normal breath sounds.   Abdominal:      Tenderness: There is no right CVA tenderness or left CVA tenderness.   Skin:     General: Skin is warm and dry.      Coloration: Skin is not pale.      Findings: No rash.   Neurological:      General: No focal deficit present.      Mental Status: She is alert and oriented to person, place, and time.   Psychiatric:         Mood and Affect: Mood normal.         Behavior: Behavior normal.         Thought Content: " Thought content normal.         Judgment: Judgment normal.                   Lab Results   Component Value Date/Time    POCCOLOR Dark Yellow 05/27/2021 11:00 AM    POCAPPEAR Slightly Cloudy 05/27/2021 11:00 AM    POCLEUKEST Negative 05/27/2021 11:00 AM    POCNITRITE Negative 05/27/2021 11:00 AM    POCUROBILIGE 0.2 05/27/2021 11:00 AM    POCPROTEIN Negative 05/27/2021 11:00 AM    POCURPH 6.0 05/27/2021 11:00 AM    POCBLOOD Negative 05/27/2021 11:00 AM    POCSPGRV 1.025 05/27/2021 11:00 AM    POCKETONES Negative 05/27/2021 11:00 AM    POCBILIRUBIN Negative 05/27/2021 11:00 AM    POCGLUCUA Negative 05/27/2021 11:00 AM    POCT pregnancy negative     Assessment/Plan:        1. Dysuria    - POCT Urinalysis  - POCT Pregnancy  - URINE CULTURE(NEW); Future    2. Encounter for Depo-Provera contraception    - medroxyPROGESTERone (DEPO-PROVERA) injection 150 mg    3. Large breasts    No evidence for UTI, continue hydration, will contact patient with urine culture results

## 2021-05-30 LAB
BACTERIA UR CULT: ABNORMAL
BACTERIA UR CULT: ABNORMAL
SIGNIFICANT IND 70042: ABNORMAL
SITE SITE: ABNORMAL
SOURCE SOURCE: ABNORMAL

## 2021-06-01 ENCOUNTER — TELEPHONE (OUTPATIENT)
Dept: MEDICAL GROUP | Facility: MEDICAL CENTER | Age: 21
End: 2021-06-01

## 2021-06-01 ENCOUNTER — APPOINTMENT (OUTPATIENT)
Dept: PHYSICAL THERAPY | Facility: REHABILITATION | Age: 21
End: 2021-06-01
Attending: FAMILY MEDICINE
Payer: COMMERCIAL

## 2021-06-01 NOTE — TELEPHONE ENCOUNTER
Please call patient. Her urine culture is positive for bacteria and she needs to be treated. She will need to be on antibiotic. I am not sure if the surgeon will allow her to go forward with surgery? She will need to call the surgeon and let them know. Thank you! Izabella Chapman PA-C

## 2021-06-01 NOTE — TELEPHONE ENCOUNTER
Pt. Informed. She states she no longer has any symptoms. Informed her that we don't have to prescribe anything if she is feeling better. She states she will check with the surgeon to see if it is still ok to take an antibiotic.

## 2021-06-03 ENCOUNTER — APPOINTMENT (OUTPATIENT)
Dept: PHYSICAL THERAPY | Facility: REHABILITATION | Age: 21
End: 2021-06-03
Payer: COMMERCIAL

## 2021-06-08 ENCOUNTER — APPOINTMENT (OUTPATIENT)
Dept: PHYSICAL THERAPY | Facility: REHABILITATION | Age: 21
End: 2021-06-08
Payer: COMMERCIAL

## 2021-06-10 ENCOUNTER — APPOINTMENT (OUTPATIENT)
Dept: PHYSICAL THERAPY | Facility: REHABILITATION | Age: 21
End: 2021-06-10
Payer: COMMERCIAL

## 2021-07-27 ENCOUNTER — OFFICE VISIT (OUTPATIENT)
Dept: MEDICAL GROUP | Facility: MEDICAL CENTER | Age: 21
End: 2021-07-27
Payer: COMMERCIAL

## 2021-07-27 VITALS
HEART RATE: 74 BPM | SYSTOLIC BLOOD PRESSURE: 96 MMHG | HEIGHT: 59 IN | OXYGEN SATURATION: 98 % | DIASTOLIC BLOOD PRESSURE: 64 MMHG | WEIGHT: 165.8 LBS | BODY MASS INDEX: 33.43 KG/M2 | TEMPERATURE: 96.6 F

## 2021-07-27 DIAGNOSIS — S21.002A BREAST WOUND, LEFT, INITIAL ENCOUNTER: ICD-10-CM

## 2021-07-27 DIAGNOSIS — L70.0 ACNE VULGARIS: ICD-10-CM

## 2021-07-27 DIAGNOSIS — F90.2 ADHD (ATTENTION DEFICIT HYPERACTIVITY DISORDER), COMBINED TYPE: ICD-10-CM

## 2021-07-27 DIAGNOSIS — Z98.890 HISTORY OF BILATERAL BREAST REDUCTION SURGERY: ICD-10-CM

## 2021-07-27 DIAGNOSIS — R09.81 NASAL CONGESTION: ICD-10-CM

## 2021-07-27 PROCEDURE — 99214 OFFICE O/P EST MOD 30 MIN: CPT | Performed by: PHYSICIAN ASSISTANT

## 2021-07-27 RX ORDER — CEPHALEXIN 500 MG/1
CAPSULE ORAL
COMMUNITY
Start: 2021-05-28 | End: 2021-07-27

## 2021-07-27 RX ORDER — LEVOCETIRIZINE DIHYDROCHLORIDE 5 MG/1
TABLET, FILM COATED ORAL
COMMUNITY
Start: 2021-07-03 | End: 2021-07-27

## 2021-07-27 RX ORDER — OXYCODONE HYDROCHLORIDE AND ACETAMINOPHEN 5; 325 MG/1; MG/1
1 TABLET ORAL EVERY 6 HOURS PRN
COMMUNITY
Start: 2021-05-28 | End: 2021-07-27

## 2021-07-27 RX ORDER — FLUTICASONE PROPIONATE 50 MCG
1 SPRAY, SUSPENSION (ML) NASAL DAILY
COMMUNITY
Start: 2021-07-03 | End: 2021-07-27

## 2021-07-27 RX ORDER — DEXTROAMPHETAMINE SACCHARATE, AMPHETAMINE ASPARTATE, DEXTROAMPHETAMINE SULFATE AND AMPHETAMINE SULFATE 2.5; 2.5; 2.5; 2.5 MG/1; MG/1; MG/1; MG/1
10 TABLET ORAL DAILY
Qty: 30 TABLET | Refills: 0 | Status: SHIPPED | OUTPATIENT
Start: 2021-07-27 | End: 2021-09-02 | Stop reason: SDUPTHER

## 2021-07-27 RX ORDER — ERYTHROMYCIN AND BENZOYL PEROXIDE 30; 50 MG/G; MG/G
GEL TOPICAL
COMMUNITY
Start: 2021-04-06 | End: 2021-07-27

## 2021-07-27 RX ORDER — LEVOCETIRIZINE DIHYDROCHLORIDE 5 MG/1
5 TABLET, FILM COATED ORAL
COMMUNITY
Start: 2021-07-03 | End: 2021-07-27

## 2021-07-27 ASSESSMENT — FIBROSIS 4 INDEX: FIB4 SCORE: 0.27

## 2021-07-27 NOTE — ASSESSMENT & PLAN NOTE
Nasal congestion and ear discomfort for about a week - started with symptoms when she started back at the [employer]. 3 weeks ago she was sick and now she is sick again. No fever/chills. Mild sore throat last week. Nasal congestion. Ear pain off and on. Using flonase and claritin. Was taking dayquil and nyquil.

## 2021-07-28 NOTE — PROGRESS NOTES
Subjective:      Eda Rothman is a 20 y.o. female who presents with Nasal Congestion (Ear (B) pain x 1 1/2 week), Wound Check (scar from breast reduction (L) breast ), Referral Needed (Dermatology), and Medication Refill (Adderall)          Chief Complaint   Patient presents with   • Nasal Congestion     Ear (B) pain x 1 1/2 week   • Wound Check     scar from breast reduction (L) breast    • Referral Needed     Dermatology   • Medication Refill     Adderall       HPIPatient presents with multiple concerns:    1. Breast reduction surgery - June. Per patient the surgery went well but she is still has a large wound on the left breast. She states she has been seeing the plastic surgeon in follow up and he just suggested wet/dry dressings. She and her parents are concerned about the large size of the wound and that it doesn't seem to be healing.    2. Nasal congestion and ear discomfort for about a week - started with symptoms when she started back at the [employer]. 3 weeks ago she was sick and now she is sick again. No fever/chills. Mild sore throat last week. Nasal congestion. Ear pain off and on. Using flonase and claritin. Was taking dayquil and nyquil.    3. Patient has tried various topical medications to help with acne. Would like to see a dermatologist referral    4. DX ADHD. Tried adderall 5mg daily, didn't feel improvement, didn't feel any negative side effects. Would like to increase to 10mg to see if that helps.  verified.     ROS5 pound weight loss. No fever/chills. No headache/dizziness. No neck pain or stiffness. No chest pain, SOB or difficulty breathing. No n/v/d/c or abdominal pain. No rash. No joint redness or swelling.    Active Ambulatory Problems     Diagnosis Date Noted   • Amenorrhea 01/29/2020   • ADHD (attention deficit hyperactivity disorder), combined type 01/29/2020   • Obesity (BMI 30-39.9) 01/29/2020   • Current moderate episode of major depressive disorder without prior  "episode (HCC) 06/25/2020   • Primary focal hyperhidrosis of palms 06/25/2020   • Irritable bowel syndrome with diarrhea-suspected 06/25/2020   • History of bilateral breast reduction surgery 11/06/2020   • Acne vulgaris 11/06/2020   • Birth control counseling 01/25/2021   • Adjustment disorder with mixed anxiety and depressed mood 02/18/2021   • Tics of organic origin 02/18/2021   • Anxiety as acute reaction to exceptional stress 02/18/2021   • Nasal congestion 07/27/2021     Resolved Ambulatory Problems     Diagnosis Date Noted   • No Resolved Ambulatory Problems     Past Medical History:   Diagnosis Date   • ADHD    • Allergy      Current Outpatient Medications   Medication Sig Dispense Refill   • amphetamine-dextroamphetamine (ADDERALL) 10 MG Tab Take 1 tablet by mouth every day for 30 days. 30 tablet 0     No current facility-administered medications for this visit.   allergy:Cats claw, uncaria tomentosa  Non-smoker   Objective:     BP (!) 96/64 (BP Location: Left arm, Patient Position: Sitting, BP Cuff Size: Adult long)   Pulse 74   Temp 35.9 °C (96.6 °F) (Temporal)   Ht 1.499 m (4' 11.02\")   Wt 75.2 kg (165 lb 12.8 oz)   LMP 07/20/2021 Comment: Depo  SpO2 98%   Breastfeeding No   BMI 33.47 kg/m²      Physical Exam  Constitutional:       General: She is not in acute distress.     Appearance: Normal appearance. She is not ill-appearing, toxic-appearing or diaphoretic.   Cardiovascular:      Rate and Rhythm: Normal rate and regular rhythm.   Pulmonary:      Effort: Pulmonary effort is normal.      Breath sounds: Normal breath sounds.   Chest:          Comments: Large open wound - dry, sub q and fascia visible  Skin:     General: Skin is warm and dry.      Coloration: Skin is not pale.      Findings: No rash.   Neurological:      General: No focal deficit present.      Mental Status: She is alert and oriented to person, place, and time.   Psychiatric:         Mood and Affect: Mood normal.         Behavior: " Behavior normal.         Thought Content: Thought content normal.         Judgment: Judgment normal.                        Assessment/Plan:        1. Nasal congestion  Discussed otc meds    2. History of bilateral breast reduction surgery    - REFERRAL TO WOUND CLINIC    3. Acne vulgaris    - REFERRAL TO DERMATOLOGY    4. ADHD (attention deficit hyperactivity disorder), combined type    - amphetamine-dextroamphetamine (ADDERALL) 10 MG Tab; Take 1 tablet by mouth every day for 30 days.  Dispense: 30 tablet; Refill: 0    5. Breast wound, left, initial encounter  - REFERRAL TO WOUND CLINIC    F/u one month

## 2021-08-16 ENCOUNTER — NON-PROVIDER VISIT (OUTPATIENT)
Dept: WOUND CARE | Facility: MEDICAL CENTER | Age: 21
End: 2021-08-16
Attending: PHYSICIAN ASSISTANT
Payer: COMMERCIAL

## 2021-08-16 PROCEDURE — 97602 WOUND(S) CARE NON-SELECTIVE: CPT

## 2021-08-16 PROCEDURE — 99211 OFF/OP EST MAY X REQ PHY/QHP: CPT

## 2021-08-16 RX ORDER — LORATADINE 10 MG/1
10 TABLET ORAL DAILY
COMMUNITY

## 2021-08-16 NOTE — CERTIFICATION
Non Provider Encounter- Full Thickness wound    HISTORY OF PRESENT ILLNESS  Wound History:    START OF CARE IN CLINIC: 21    REFERRING PROVIDER: LIZZIE LYONS   WOUND- Full Thickness Wound   LOCATION: left breast inferior   HISTORY: 20 year old female presents with wound post elective surgical breast reduction surgery on 21. Patient states she developed a hematoma that burst and then turned black; she applied neosporin per her surgeon and the eschar came off to expose yellow beneath. Patient reported then the thick yellow fell off after time and she has been doing a wet to dry dressing per her surgeon recommendation since. She states her plastic surgeon is planning to remove all the scar tissue to the left breast after the heals. Patient is scheduled for a [facility] provider excisional debridement of epibole edges and then possible SNAP placement as appropriate.     Pertinent Labs and Diagnostics:    Labs:     A1c: No results found for: HBA1C       IMAGIN21 x-ray spine    VASCULAR STUDIES: none    LAST  WOUND CULTURE:  DATE : none            FALL RISK ASSESSMENT: not a fall risk    Patient allergies and medications reviewed via Epic.     Wound Assessment:   Wound 21 Breast Inferior Left (Active)   Wound Image    21 1300   Site Assessment Red;Yellow;[place] 21 1300   Periwound Assessment Scar tissue 21 1300   Margins Epibole (rolled edges);Attached edges 21 1300   Closure Secondary intention 21 1300   Drainage Amount Scant 21 1300   Drainage Description Serous 21 1300   Treatments Cleansed 21 1300   Wound Cleansing Normal Saline Irrigation 21 1300   Periwound Protectant Skin Protectant Wipes to Periwound 21 1300   Dressing Cleansing/Solutions Normal Saline 21 1300   Dressing Options Collagen Dressing;Hydrofera Blue Ready;Transparent Film;Hypafix Tape 21 1300   Dressing Changed Changed 21 1300   Dressing Change/Treatment  Frequency Every 72 hrs, and As Needed 08/16/21 1300   Non-staged Wound Description Full thickness 08/16/21 1300   Wound Length (cm) 2 cm 08/16/21 1300   Wound Width (cm) 1.5 cm 08/16/21 1300   Wound Depth (cm) 0.6 cm 08/16/21 1300   Wound Surface Area (cm^2) 3 cm^2 08/16/21 1300   Wound Volume (cm^3) 1.8 cm^3 08/16/21 1300   Post-Procedure Length (cm) 2 cm 08/16/21 1300   Post-Procedure Width (cm) 1.5 cm 08/16/21 1300   Post-Procedure Depth (cm) 0.6 cm 08/16/21 1300   Post-Procedure Surface Area (cm^2) 3 cm^2 08/16/21 1300   Post-Procedure Volume (cm^3) 1.8 cm^3 08/16/21 1300   Wound Bed Granulation (%) 80 % 08/16/21 1300   Wound Bed Granulation (%) - Post-Procedure 80 % 08/16/21 1300   Tunneling (cm) 0 cm 08/16/21 1300   Undermining (cm) 0.5 cm 08/16/21 1300   Undermining of Wound, 1st Location From 7 o'clock;To 7 o'clock 08/16/21 1300   Undermining (cm) - 2nd location 0.5 cm 08/16/21 1300   Undermining of Wound, 2nd Location From 11 o'clock;To 2 o'clock 08/16/21 1300   Wound Odor None 08/16/21 1300   Exposed Structures Adipose 08/16/21 1300      PATIENT EDUCATION  -Advised to go to ER for any increased redness, swelling, drainage or odor, or if patient develops fever, chills, nausea or vomiting.  -Importance of adequate nutrition for wound healing  -Increase protein intake (unless contraindicated by renal status)  -Should you experience any significant changes in your wound(s) such as infection (redness, swelling, localized heat, increased pain, fever >101 F, chills) or have any questions regarding your home care instructions, please contact the wound center [phone]. If after hours, contact your primary care physician or go the hospital emergency room.  Keep dressing clean and dry and cover while bathing. Only change dressing if over saturated, soiled or its falling off or every 72 hours as demonstrated & you taught back during your appointment.

## 2021-08-16 NOTE — PATIENT INSTRUCTIONS
Should you experience any significant changes in your wound(s) such as infection (redness, swelling, localized heat, increased pain, fever >101 F, chills) or have any questions regarding your home care instructions, please contact the wound center [phone]. If after hours, contact your primary care physician or go the hospital emergency room.  Keep dressing clean and dry and cover while bathing. Only change dressing if over saturated, soiled or its falling off or every 72 hours as demonstrated & you taught back during your appointment.

## 2021-08-16 NOTE — PROCEDURES
Non-selective debridement to remove non-viable biofilm from left breast wound and callous at edges.

## 2021-08-17 ENCOUNTER — TELEPHONE (OUTPATIENT)
Dept: WOUND CARE | Facility: MEDICAL CENTER | Age: 21
End: 2021-08-17

## 2021-08-18 ENCOUNTER — OFFICE VISIT (OUTPATIENT)
Dept: WOUND CARE | Facility: MEDICAL CENTER | Age: 21
End: 2021-08-18
Attending: PHYSICIAN ASSISTANT
Payer: COMMERCIAL

## 2021-08-18 DIAGNOSIS — S21.009D: ICD-10-CM

## 2021-08-25 ENCOUNTER — NON-PROVIDER VISIT (OUTPATIENT)
Dept: WOUND CARE | Facility: MEDICAL CENTER | Age: 21
End: 2021-08-25
Attending: PHYSICIAN ASSISTANT
Payer: COMMERCIAL

## 2021-08-25 NOTE — CERTIFICATION
"    Advanced [facility]   [facility]   [address]   [address]   [place] [address]   [phone] Fax: [phone]    Discharge Note        Wound location: left breast  Date of Discharge: 08/25/21    Assessment:  Discharge patient at this time secondary to patient discontinuing wound care herself. Patient was seen once in wound clinic on 8/16/21, was scheduled to see provider on 8/18 and have NPWT (SNAP vac) applied, but patient was a late cancel for that appointment. Patient scheduled for appt today at 1100. She called at 1100 and spoke with RICKEY Coronado, stated \"I spoke with my parent's surgeon and they said I don't need wound care so I won't be coming back.\" Patient instructed by RICKEY that she will have to get a new referral if she wishes to come back to wound care for treatment.             "

## 2021-08-31 ENCOUNTER — OFFICE VISIT (OUTPATIENT)
Dept: MEDICAL GROUP | Facility: MEDICAL CENTER | Age: 21
End: 2021-08-31
Payer: COMMERCIAL

## 2021-08-31 VITALS
WEIGHT: 159.2 LBS | DIASTOLIC BLOOD PRESSURE: 62 MMHG | TEMPERATURE: 97.7 F | HEART RATE: 92 BPM | OXYGEN SATURATION: 96 % | HEIGHT: 59 IN | BODY MASS INDEX: 32.09 KG/M2 | SYSTOLIC BLOOD PRESSURE: 88 MMHG

## 2021-08-31 DIAGNOSIS — F90.2 ADHD (ATTENTION DEFICIT HYPERACTIVITY DISORDER), COMBINED TYPE: ICD-10-CM

## 2021-08-31 DIAGNOSIS — Z30.42 ENCOUNTER FOR DEPO-PROVERA CONTRACEPTION: ICD-10-CM

## 2021-08-31 DIAGNOSIS — E66.9 OBESITY (BMI 30-39.9): ICD-10-CM

## 2021-08-31 PROCEDURE — 99213 OFFICE O/P EST LOW 20 MIN: CPT | Mod: 25 | Performed by: PHYSICIAN ASSISTANT

## 2021-08-31 PROCEDURE — 96372 THER/PROPH/DIAG INJ SC/IM: CPT | Performed by: PHYSICIAN ASSISTANT

## 2021-08-31 RX ORDER — MEDROXYPROGESTERONE ACETATE 150 MG/ML
150 INJECTION, SUSPENSION INTRAMUSCULAR ONCE
Status: COMPLETED | OUTPATIENT
Start: 2021-08-31 | End: 2021-08-31

## 2021-08-31 RX ADMIN — MEDROXYPROGESTERONE ACETATE 150 MG: 150 INJECTION, SUSPENSION INTRAMUSCULAR at 11:17

## 2021-08-31 ASSESSMENT — FIBROSIS 4 INDEX: FIB4 SCORE: 0.27

## 2021-08-31 NOTE — ASSESSMENT & PLAN NOTE
Not sure that the adderall is working and worried if she takes more she will have more side effects. Agrees to see psychiatrist to discuss.

## 2021-08-31 NOTE — PROGRESS NOTES
"Subjective:   Eda Rothman is a 20 y.o. female here today for adhd follow up    ADHD (attention deficit hyperactivity disorder), combined type  Not sure that the adderall is working and worried if she takes more she will have more side effects. Agrees to see psychiatrist to discuss.    Encounter for Depo-Provera contraception  2 days late per calendar recommendation, urine pregnancy negative, depo provera vaccine given    Obesity (BMI 30-39.9)  Working on diet and exercise       Current medicines (including changes today)  Current Outpatient Medications   Medication Sig Dispense Refill   • loratadine (CLARITIN) 10 MG Tab Take 10 mg by mouth every day. Indications: Hayfever       No current facility-administered medications for this visit.     She  has a past medical history of ADHD and Allergy. She also has no past medical history of ASTHMA or Diabetes.    ROS   No fever/chills. No weight change. No headache/dizziness. No focal weakness. No sore throat, nasal congestion, ear pain. No chest pain, no shortness of breath, difficulty breathing. No n/v/d/c or abdominal pain. No urinary complaint. No rash or skin lesion. No joint pain or swelling.       Objective:     BP (!) 88/62 (BP Location: Left arm, Patient Position: Sitting, BP Cuff Size: Adult)   Pulse 92   Temp 36.5 °C (97.7 °F) (Temporal)   Ht 1.499 m (4' 11.02\")   Wt 72.2 kg (159 lb 3.2 oz)   SpO2 96%  Body mass index is 32.14 kg/m².   Physical Exam:  Constitutional: WDWN, NAD  Skin: Warm, dry, good turgor, no rashes in visible areas.  Psych: Alert and oriented x3, normal affect and mood.      Assessment and Plan:   The following treatment plan was discussed    1. ADHD (attention deficit hyperactivity disorder), combined type    - REFERRAL TO PSYCHIATRY    2. Encounter for Depo-Provera contraception    - POCT Urinalysis  - medroxyPROGESTERone (DEPO-PROVERA) injection 150 mg    3. Obesity (BMI 30-39.9)        Followup: 3 months         "

## 2021-09-02 DIAGNOSIS — F90.2 ADHD (ATTENTION DEFICIT HYPERACTIVITY DISORDER), COMBINED TYPE: ICD-10-CM

## 2021-09-07 RX ORDER — DEXTROAMPHETAMINE SACCHARATE, AMPHETAMINE ASPARTATE, DEXTROAMPHETAMINE SULFATE AND AMPHETAMINE SULFATE 2.5; 2.5; 2.5; 2.5 MG/1; MG/1; MG/1; MG/1
10 TABLET ORAL DAILY
Qty: 30 TABLET | Refills: 0 | Status: SHIPPED | OUTPATIENT
Start: 2021-09-07 | End: 2021-10-07

## 2021-10-18 ENCOUNTER — TELEMEDICINE (OUTPATIENT)
Dept: MEDICAL GROUP | Facility: MEDICAL CENTER | Age: 21
End: 2021-10-18
Payer: COMMERCIAL

## 2021-10-18 VITALS — TEMPERATURE: 98 F | BODY MASS INDEX: 31.04 KG/M2 | HEIGHT: 59 IN | WEIGHT: 154 LBS

## 2021-10-18 DIAGNOSIS — F90.2 ADHD (ATTENTION DEFICIT HYPERACTIVITY DISORDER), COMBINED TYPE: ICD-10-CM

## 2021-10-18 PROCEDURE — 99213 OFFICE O/P EST LOW 20 MIN: CPT | Mod: 95 | Performed by: PHYSICIAN ASSISTANT

## 2021-10-18 RX ORDER — DEXTROAMPHETAMINE SACCHARATE, AMPHETAMINE ASPARTATE, DEXTROAMPHETAMINE SULFATE AND AMPHETAMINE SULFATE 3.75; 3.75; 3.75; 3.75 MG/1; MG/1; MG/1; MG/1
15 TABLET ORAL DAILY
Qty: 30 TABLET | Refills: 0 | Status: SHIPPED | OUTPATIENT
Start: 2021-11-18 | End: 2021-12-18

## 2021-10-18 RX ORDER — DEXTROAMPHETAMINE SACCHARATE, AMPHETAMINE ASPARTATE, DEXTROAMPHETAMINE SULFATE AND AMPHETAMINE SULFATE 3.75; 3.75; 3.75; 3.75 MG/1; MG/1; MG/1; MG/1
15 TABLET ORAL DAILY
Qty: 30 TABLET | Refills: 0 | Status: SHIPPED | OUTPATIENT
Start: 2021-12-18 | End: 2022-03-04 | Stop reason: SDUPTHER

## 2021-10-18 RX ORDER — DEXTROAMPHETAMINE SACCHARATE, AMPHETAMINE ASPARTATE, DEXTROAMPHETAMINE SULFATE AND AMPHETAMINE SULFATE 3.75; 3.75; 3.75; 3.75 MG/1; MG/1; MG/1; MG/1
15 TABLET ORAL DAILY
Qty: 30 TABLET | Refills: 0 | Status: SHIPPED | OUTPATIENT
Start: 2021-10-18 | End: 2021-11-17

## 2021-10-18 ASSESSMENT — FIBROSIS 4 INDEX: FIB4 SCORE: 0.29

## 2021-10-18 ASSESSMENT — PATIENT HEALTH QUESTIONNAIRE - PHQ9: CLINICAL INTERPRETATION OF PHQ2 SCORE: 0

## 2021-10-18 NOTE — PROGRESS NOTES
Virtual Visit: Established Patient   This visit was conducted via Zoom using secure and encrypted videoconferencing technology.   The patient was in a private location in the state of [place].    The patient's identity was confirmed and verbal consent was obtained for this virtual visit.    Subjective:   CC:   Chief Complaint   Patient presents with   • Medication Refill     Adderral, requesting 3 month supply     Eda Rothman is a 21 y.o. female presenting for evaluation and management of:    ADHD (attention deficit hyperactivity disorder), combined type  Chronic, taking adderall 10mg daily, would like to increase to 15mg daily. Takes morning classes and doesn't want the medication to negatively affect her sleep so she would not like to take extended release.      ROS no weight change. No fever/chills. No headache/dizziness. No chest pain, SOB or difficulty breathing. No rash or skin lesion      Current medicines (including changes today)  Current Outpatient Medications   Medication Sig Dispense Refill   • [START ON 12/18/2021] amphetamine-dextroamphetamine (ADDERALL) 15 MG tablet Take 1 Tablet by mouth every day for 30 days. 30 Tablet 0   • [START ON 11/18/2021] amphetamine-dextroamphetamine (ADDERALL) 15 MG tablet Take 1 Tablet by mouth every day for 30 days. 30 Tablet 0   • amphetamine-dextroamphetamine (ADDERALL) 15 MG tablet Take 1 Tablet by mouth every day for 30 days. 30 Tablet 0   • loratadine (CLARITIN) 10 MG Tab Take 10 mg by mouth every day. Indications: Hayfever       No current facility-administered medications for this visit.       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  Skin: No rashes in visible areas.  Eye: Round. Conjunctiva clear, lids normal. No icterus.   ENMT: Lips pink without lesions, good dentition, moist mucous membranes. Phonation normal.  Neck: No masses, no thyromegaly. Moves freely without pain.  Respiratory: Unlabored respiratory effort, no cough or audible wheeze  Psych: Alert and oriented x3, normal affect and mood.     Assessment and Plan:   The following treatment plan was discussed:     [facility] Aware web site evaluation: I have obtained and reviewed patient utilization report from [facility] pharmacy database prior to writing prescription for controlled substance II, III or IV. Based on the report and my clinical assessment the prescription is medically necessary.     1. ADHD (attention deficit hyperactivity disorder), combined type  - amphetamine-dextroamphetamine (ADDERALL) 15 MG tablet; Take 1 Tablet by mouth every day for 30 days.  Dispense: 30 Tablet; Refill: 0  - amphetamine-dextroamphetamine (ADDERALL) 15 MG tablet; Take 1 Tablet by mouth every day for 30 days.  Dispense: 30 Tablet; Refill: 0  - amphetamine-dextroamphetamine (ADDERALL) 15 MG tablet; Take 1 Tablet by mouth every day for 30 days.  Dispense: 30 Tablet; Refill: 0      Follow-up: 3 months         "

## 2021-10-18 NOTE — ASSESSMENT & PLAN NOTE
Chronic, taking adderall 10mg daily, would like to increase to 15mg daily. Takes morning classes and doesn't want the medication to negatively affect her sleep so she would not like to take extended release.

## 2022-03-04 ENCOUNTER — TELEPHONE (OUTPATIENT)
Dept: MEDICAL GROUP | Facility: MEDICAL CENTER | Age: 22
End: 2022-03-04
Payer: COMMERCIAL

## 2022-03-04 ENCOUNTER — PATIENT MESSAGE (OUTPATIENT)
Dept: MEDICAL GROUP | Facility: MEDICAL CENTER | Age: 22
End: 2022-03-04
Payer: COMMERCIAL

## 2022-03-04 DIAGNOSIS — F90.2 ADHD (ATTENTION DEFICIT HYPERACTIVITY DISORDER), COMBINED TYPE: ICD-10-CM

## 2022-03-04 RX ORDER — DEXTROAMPHETAMINE SACCHARATE, AMPHETAMINE ASPARTATE, DEXTROAMPHETAMINE SULFATE AND AMPHETAMINE SULFATE 3.75; 3.75; 3.75; 3.75 MG/1; MG/1; MG/1; MG/1
15 TABLET ORAL DAILY
Qty: 30 TABLET | Refills: 0 | Status: SHIPPED | OUTPATIENT
Start: 2022-03-04 | End: 2022-04-03

## 2022-03-04 NOTE — TELEPHONE ENCOUNTER
1. Caller Name: Eda Rothman                        Call Back Number: [phone] (home)       How would the patient prefer to be contacted with a response: Phone call do NOT leave a detailed message    Pt's appointment was rescheduled due to Izabella being out of office, per pt has final for the next two weeks and is wondering if she can have partial refill of her Adderral to get her through.  Please advise, thanks.

## 2022-04-05 ENCOUNTER — OFFICE VISIT (OUTPATIENT)
Dept: MEDICAL GROUP | Facility: MEDICAL CENTER | Age: 22
End: 2022-04-05
Payer: COMMERCIAL

## 2022-04-05 VITALS
HEART RATE: 79 BPM | BODY MASS INDEX: 31.85 KG/M2 | DIASTOLIC BLOOD PRESSURE: 70 MMHG | WEIGHT: 158 LBS | HEIGHT: 59 IN | TEMPERATURE: 97.4 F | SYSTOLIC BLOOD PRESSURE: 98 MMHG | OXYGEN SATURATION: 98 %

## 2022-04-05 DIAGNOSIS — F43.23 ADJUSTMENT DISORDER WITH MIXED ANXIETY AND DEPRESSED MOOD: ICD-10-CM

## 2022-04-05 DIAGNOSIS — F90.2 ADHD (ATTENTION DEFICIT HYPERACTIVITY DISORDER), COMBINED TYPE: ICD-10-CM

## 2022-04-05 PROCEDURE — 99213 OFFICE O/P EST LOW 20 MIN: CPT | Performed by: PHYSICIAN ASSISTANT

## 2022-04-05 RX ORDER — MONTELUKAST SODIUM 10 MG/1
TABLET ORAL
COMMUNITY
Start: 2022-03-09 | End: 2022-04-05

## 2022-04-05 RX ORDER — FLUTICASONE PROPIONATE 50 MCG
1 SPRAY, SUSPENSION (ML) NASAL DAILY
COMMUNITY

## 2022-04-05 ASSESSMENT — PATIENT HEALTH QUESTIONNAIRE - PHQ9: CLINICAL INTERPRETATION OF PHQ2 SCORE: 0

## 2022-04-05 ASSESSMENT — FIBROSIS 4 INDEX: FIB4 SCORE: 0.29

## 2022-04-05 NOTE — ASSESSMENT & PLAN NOTE
Feels the medication isn't working well any more. Maybe talking more, fixating on things more, trouble focusing. Doesn't seem to have the same effect as it did before. Would like to try increasing the dose. Would be willing to see a psychiatrist

## 2022-04-06 RX ORDER — DEXTROAMPHETAMINE SACCHARATE, AMPHETAMINE ASPARTATE, DEXTROAMPHETAMINE SULFATE AND AMPHETAMINE SULFATE 5; 5; 5; 5 MG/1; MG/1; MG/1; MG/1
20 TABLET ORAL EVERY MORNING
Qty: 30 TABLET | Refills: 0 | Status: SHIPPED | OUTPATIENT
Start: 2022-06-05 | End: 2022-06-27

## 2022-04-06 RX ORDER — DEXTROAMPHETAMINE SACCHARATE, AMPHETAMINE ASPARTATE, DEXTROAMPHETAMINE SULFATE AND AMPHETAMINE SULFATE 5; 5; 5; 5 MG/1; MG/1; MG/1; MG/1
20 TABLET ORAL EVERY MORNING
Qty: 30 TABLET | Refills: 0 | Status: SHIPPED | OUTPATIENT
Start: 2022-04-06 | End: 2022-05-06

## 2022-04-06 RX ORDER — DEXTROAMPHETAMINE SACCHARATE, AMPHETAMINE ASPARTATE, DEXTROAMPHETAMINE SULFATE AND AMPHETAMINE SULFATE 5; 5; 5; 5 MG/1; MG/1; MG/1; MG/1
20 TABLET ORAL EVERY MORNING
Qty: 30 TABLET | Refills: 0 | Status: SHIPPED | OUTPATIENT
Start: 2022-05-05 | End: 2022-06-04

## 2022-04-06 NOTE — PROGRESS NOTES
"Subjective:   Eda Rothman is a 21 y.o. female here today for f/u medication    ADHD (attention deficit hyperactivity disorder), combined type  Feels the medication isn't working well any more. Maybe talking more, fixating on things more, trouble focusing. Doesn't seem to have the same effect as it did before. Would like to try increasing the dose. Would be willing to see a psychiatrist       Current medicines (including changes today)  Current Outpatient Medications   Medication Sig Dispense Refill   • amphetamine-dextroamphetamine (ADDERALL) 20 MG Tab Take 1 Tablet by mouth every morning for 30 days. 30 Tablet 0   • [START ON 5/5/2022] amphetamine-dextroamphetamine (ADDERALL) 20 MG Tab Take 1 Tablet by mouth every morning for 30 days. 30 Tablet 0   • [START ON 6/5/2022] amphetamine-dextroamphetamine (ADDERALL) 20 MG Tab Take 1 Tablet by mouth every morning for 30 days. 30 Tablet 0   • fluticasone (FLONASE) 50 MCG/ACT nasal spray Administer 1 Spray into affected nostril(S) every day.     • loratadine (CLARITIN) 10 MG Tab Take 10 mg by mouth every day. Indications: Hayfever       No current facility-administered medications for this visit.     She  has a past medical history of ADHD and Allergy.    She has no past medical history of ASTHMA or Diabetes.    ROS   No fever/chills. No weight change. No headache/dizziness. No focal weakness. No sore throat, nasal congestion, ear pain. No chest pain, no shortness of breath, difficulty breathing. No n/v/d/c or abdominal pain. No urinary complaint. No rash or skin lesion. No joint pain or swelling.       Objective:     BP (!) 98/70 (BP Location: Left arm, Patient Position: Sitting, BP Cuff Size: Adult)   Pulse 79   Temp 36.3 °C (97.4 °F) (Temporal)   Ht 1.499 m (4' 11.02\")   Wt 71.7 kg (158 lb)   SpO2 98%  Body mass index is 31.89 kg/m².   Physical Exam:  Constitutional: WDWN, NAD  Skin: Warm, dry, good turgor, no rashes in visible areas.  Psych: Alert and " oriented x3, normal affect and mood.    Assessment and Plan:   The following treatment plan was discussed    1. ADHD (attention deficit hyperactivity disorder), combined type    - Referral to Psychiatry  - amphetamine-dextroamphetamine (ADDERALL) 20 MG Tab; Take 1 Tablet by mouth every morning for 30 days.  Dispense: 30 Tablet; Refill: 0  - amphetamine-dextroamphetamine (ADDERALL) 20 MG Tab; Take 1 Tablet by mouth every morning for 30 days.  Dispense: 30 Tablet; Refill: 0  - amphetamine-dextroamphetamine (ADDERALL) 20 MG Tab; Take 1 Tablet by mouth every morning for 30 days.  Dispense: 30 Tablet; Refill: 0    2. Adjustment disorder with mixed anxiety and depressed mood    - Referral to Psychiatry      Followup: will establish with new PCP as I am leaving [facility]

## 2022-06-02 ENCOUNTER — OFFICE VISIT (OUTPATIENT)
Dept: BEHAVIORAL HEALTH | Facility: CLINIC | Age: 22
End: 2022-06-02
Payer: COMMERCIAL

## 2022-06-02 DIAGNOSIS — F90.2 ADHD (ATTENTION DEFICIT HYPERACTIVITY DISORDER), COMBINED TYPE: ICD-10-CM

## 2022-06-02 PROBLEM — F32.1 CURRENT MODERATE EPISODE OF MAJOR DEPRESSIVE DISORDER WITHOUT PRIOR EPISODE (HCC): Status: RESOLVED | Noted: 2020-06-25 | Resolved: 2022-06-02

## 2022-06-02 PROCEDURE — 99214 OFFICE O/P EST MOD 30 MIN: CPT | Performed by: PSYCHIATRY & NEUROLOGY

## 2022-06-02 RX ORDER — METHYLPHENIDATE HYDROCHLORIDE 20 MG/1
20 CAPSULE, EXTENDED RELEASE ORAL EVERY MORNING
Qty: 30 CAPSULE | Refills: 0 | Status: SHIPPED | OUTPATIENT
Start: 2022-06-02 | End: 2022-07-02

## 2022-06-02 NOTE — PROGRESS NOTES
"IN-PERSON SESSION IN CLINIC      INITIAL PSYCHIATRIC EVALUATION      This provider informed the patient their medical records are totally confidential except for the use by other providers involved in their care, or if the patient signs a release, or to report instances of child or elder abuse, or if it is determined they are an immediate risk to harm themselves or others.      CHIEF COMPLAINT  \"need help with focus\"      HISTORY OF PRESENT ILLNESS  Eda Rothman is a 21 y.o. old female comes in today to establish care and for evaluation of inattention.  I did reviewed all outpatient psychiatry follow up notes over last 3 years. Patient was seen by Dr. Monsivais for one visit only in this clinic for diagnosis of MDD and ADHD, with the following recommendation done during last visit in July 2020 (2 years ago):       Start fluoxetine 20mg po QAM for depression.       Start methylphenidate 20mg po BID for ADHD.    Patient was recently following with her primary care physician assistant and the dose of Adderall XR was increased to 20 mg in April 2020    Patient with history of ADHD, which was treated when she was in high school with the stimulant methylphenidate.  Patient describes symptoms of inattention and hyperactivity during school time and struggle with inattention during college time as well.    Patient reports Adderall as too strong for her and she feels shut down with less talking and feeling drugged.  Patient continues to have symptoms of inattention with forgetfulness and struggling and education and work.  Patient has done well on Ritalin during childhood and 20 mg dose and agreed with switching Adderall XR to Ritalin LA 20 mg dose with close monitoring for side effects and benefits.    Patient was prescribed fluoxetine in the past but she never took the medication and felt Ritalin 20 mg twice daily was too strong at that time.  She had a panic attack on this dose of Ritalin and prefers to start at a " lower dose.  She is currently denying any symptoms of depression, anxiety, petty, hypomania or psychosis.    Patient also reported having panic attack yesterday and she will be leaving her parents home for 2 weeks for a work in [place].  Patient have difficulty with separation and reports anxiety as a byproduct of that.  Patient prefers to stay on less medications and agreed with initiating referral for psychotherapy.    Here is the result of the adult ADHD self-report scale done today:    Adult ADHD Self-Report Scale (ASRS-v1.1) Symptom    1. How often do you have trouble wrapping up the final details of a project, once the challenging parts have been done?   Very often (6/2/22)  2. How often do you have difficulty getting things in order when you have to do a task that requires organization?   Very often (6/2/22)  3. How often do you have problems remembering appointments or obligations?   Often (6/2/22)  4. When you have a task that requires a lot of thought, how often do you avoid or delay getting started?   Often (6/2/22)  5. How often do you fidget or squirm with your hands or feet when you have to sit down for a long time?   Often (6/2/22)  6. How often do you feel overly active and compelled to do things, like you were driven by a motor?   Rarely (6/2/22)  7. How often do you make careless mistakes when you have to work on a boring or difficult project?   Very often (6/2/22)  8. How often do you have difficulty keeping your attention when you are doing boring or repetitive work?   Very often (6/2/22)  9. How often do you have difficulty concentrating on what people say to you, even when they are speaking to you directly?   Very often (6/2/22)  10. How often do you misplace or have difficulty finding things at home or at work?   Very often (6/2/22)  11. How often are you distracted by activity or noise around you?   Very often (6/2/22)  12. How often do you leave your seat in meetings or other situations in  which you are expected to remain seated?   Very often (6/2/22)  13. How often do you feel restless or fidgety?   Sometime (6/2/22)  14. How often do you have difficulty unwinding and relaxing when you have time to yourself?   Rarely (6/2/22)  15. How often do you find yourself talking too much when you are in social situations?   Sometime (6/2/22)  16. When you're in a conversation, how often do you find yourself finishing the sentences of the people you are talking to, before they can finish them themselves?   Very often (6/2/22)  17. How often do you have difficulty waiting your turn in situations when turn taking is required?   Very often (6/2/22)  18. How often do you interrupt others when they are busy?   Very often (6/2/22)      PSYCHIATRIC REVIEW OF SYSTEMS: denies depressive symptoms, denies symptoms of anxiety that interfere with functioning or are overwhelming, denies manic symptoms, denies psychotic symptoms including AH / VH, denies OCD symptoms, denies restrictive eating or purging and denies trauma related symptoms      MEDICAL REVIEW OF SYSTEMS:   Constitutional negative   Eyes negative   Ears/Nose/Mouth/Throat negative   Cardiovascular negative   Respiratory negative   Gastrointestinal  irritable bowel syndroime   Genitourinary negative   Muscular negative   Integumentary negative   Neurological negative   Endocrine negative   Hematologic/Lymphatic negative     CURRENT MEDICATIONS:  Current Outpatient Medications   Medication Sig Dispense Refill   • amphetamine-dextroamphetamine (ADDERALL) 20 MG Tab Take 1 Tablet by mouth every morning for 30 days. 30 Tablet 0   • [START ON 6/5/2022] amphetamine-dextroamphetamine (ADDERALL) 20 MG Tab Take 1 Tablet by mouth every morning for 30 days. 30 Tablet 0   • fluticasone (FLONASE) 50 MCG/ACT nasal spray Administer 1 Spray into affected nostril(S) every day.     • loratadine (CLARITIN) 10 MG Tab Take 10 mg by mouth every day. Indications: Hayfever       No  current facility-administered medications for this visit.       ALLERGIES:  Cats claw, uncaria tomentosa    PAST PSYCHIATRIC HISTORY  Prior psychiatric hospitalization: no  Prior Self harm/suicide attempt: no  Prior Diagnosis:   · ADHD, Combined Type: which was treated when she was in high school with the stimulant methylphenidate    PAST PSYCHIATRIC MEDICATIONS  • Adderall  • Ritalin: most effective one     FAMILY HISTORY  None    SUBSTANCE USE HISTORY:  Denies    SOCIAL HISTORY  Childhood: Born in [place], [place] and raised in [place], [place]  Currently at [employer] and working as well  Lives with parents  Not in relationship  No kids  No trauma or abuse history    MEDICAL HISTORY  Past Medical History:   Diagnosis Date   • ADHD    • Allergy      No past surgical history on file.      PHYSICAL EXAMINAION:  Vital signs: There were no vitals taken for this visit.  Musculoskeletal: Normal gait.   Abnormal movements: none    MENTAL STATUS EXAMINATION      General:   - Grooming and hygiene: Casual,   - Apparent distress: none,   - Behavior: Calm  - Eye Contact:  Good,   - no psychomotor agitation or retardation    - Participation: Active verbal participation  Orientation: Alert and Fully Oriented to person, place and time  Mood: Anxious  Affect: Flexible,  Thought Process: Logical and Goal-directed  Thought Content: Denies suicidal or homicidal ideations, intent or plan Within normal limits  Perception: Denies auditory or visual hallucinations. No delusions noted Within normal limits  Attention span and concentration: Intact   Speech:Rate within normal limits and Volume within normal limits  Language: Appropriate   Insight: Good  Judgment: Good  Recent and remote memory: No gross evidence of memory deficits      DEPRESSION SCREENING:  Depression Screen (PHQ-2/PHQ-9) 5/27/2021 10/18/2021 4/5/2022   PHQ-2 Total Score 0 - -   PHQ-2 Total Score - 0 0   PHQ-9 Total Score 10 - -   PHQ-9 Total Score - - -       Interpretation of PHQ-9  Total Score   Score Severity   1-4 No Depression   5-9 Mild Depression   10-14 Moderate Depression   15-19 Moderately Severe Depression   20-27 Severe Depression      SAFETY ASSESSMENT - SELF:    Does patient acknowledge current or past symptoms of dangerousness to self? no  History of suicide by family member: no  History of suicide by friend/significant other: no  Recent change in amount/specificity/intensity of suicidal thoughts or self-harm behavior? no  Current access to firearms, medications, or other identified means of suicide/self-harm? no  IProtective factors present: family, work, education       SAFETY ASSESSMENT - OTHERS:    Does patient acknowledge current or past symptoms of aggressive behavior or risk to others? no  Recent change in amount/specificity/intensity of thoughts or threats to harm others? no  Current access to firearms/other identified means of harm? no      CURRENT RISK:       Suicidal: Low       Homicidal: Low       Self-Harm: Low       Relapse: Low       Crisis Safety Plan Reviewed Not Indicated    MEDICAL RECORDS/LABS/DIAGNOSTIC TESTS REVIEWED:  Component      Latest Ref Rng & Units 7/1/2020           7:46 AM   TSH      0.450 - 4.500 uIU/mL 1.070         [place]  records -   Reviewed    PLAN:  (1) ADHD; (2) Anxiety   • Stop Adderall XR  • Add Ritalin LA 20 mg daily for ADHD. 30 tabs with 0 refill given  • Controlled medication treatment agreement signed today (6/2/22)  • Initiate referral for psychotherapy for ADHD and anxiety management.  • Information provided on ADHD coaches for ADHD management.  • Medication options, alternatives (including no medications) and medication risks/benefits/side effects were discussed in detail.  • Explained importance of contraceptive measures while on psychotropic medications, educated to let provider know if ever pregnant or wanting to become pregnant. Verbalized understanding.  • The patient was advised to call, message provider on [facility], or come in  to the clinic if symptoms worsen or if any future questions/issues regarding their medications arise; the patient verbalized understanding and agreement.    • The patient was educated to call 911, call the suicide hotline, or go to local ER if having thoughts of suicide or homicide; verbalized understanding.    Return to clinic in 1 month or sooner if symptoms worsen.  Next Appointment:  instruction provided on how to make the next appointment.     The proposed treatment plan was discussed with the patient who was provided the opportunity to ask questions and make suggestions regarding alternative treatment. Patient verbalized understanding and expressed agreement with the plan.     Thank you for allowing me to participate in the care of this patient.    Ivan Briggs M.D.  06/02/22    CC:   Izabella Chapman P.A.-C.    This note was created using voice recognition software (Dragon). The accuracy of the dictation is limited by the abilities of the software. I have reviewed the note prior to signing, however some errors in grammar and context are still possible. If you have any questions related to this note please do not hesitate to contact our office.

## 2022-06-27 ENCOUNTER — OFFICE VISIT (OUTPATIENT)
Dept: MEDICAL GROUP | Facility: PHYSICIAN GROUP | Age: 22
End: 2022-06-27
Payer: COMMERCIAL

## 2022-06-27 VITALS
WEIGHT: 159.8 LBS | RESPIRATION RATE: 12 BRPM | HEART RATE: 72 BPM | BODY MASS INDEX: 30.17 KG/M2 | TEMPERATURE: 97.6 F | OXYGEN SATURATION: 96 % | DIASTOLIC BLOOD PRESSURE: 60 MMHG | SYSTOLIC BLOOD PRESSURE: 86 MMHG | HEIGHT: 61 IN

## 2022-06-27 DIAGNOSIS — J06.9 URTI (ACUTE UPPER RESPIRATORY INFECTION): ICD-10-CM

## 2022-06-27 PROCEDURE — 99213 OFFICE O/P EST LOW 20 MIN: CPT

## 2022-06-27 RX ORDER — BENZONATATE 100 MG/1
100 CAPSULE ORAL 3 TIMES DAILY PRN
Qty: 60 CAPSULE | Refills: 0 | Status: SHIPPED | OUTPATIENT
Start: 2022-06-27 | End: 2023-04-05

## 2022-06-27 ASSESSMENT — ENCOUNTER SYMPTOMS
CHILLS: 0
FEVER: 0
COUGH: 1
SHORTNESS OF BREATH: 1
SPUTUM PRODUCTION: 1
WHEEZING: 0

## 2022-06-27 ASSESSMENT — FIBROSIS 4 INDEX: FIB4 SCORE: 0.29

## 2022-06-27 NOTE — ASSESSMENT & PLAN NOTE
- This is a new self-limited condition  - Recommend vigorous hydration and use of Mucinex to help bring up excessive secretions  -Tessalon 100 mg prescribed 3 times daily as needed for cough  - Recommend NeilMed sinus rinse to use daily and then administer Flonase after rinse for allergy suppression  - If symptoms persist or worsen in the next 5 days please notify clinic  - ED precautions given and known for worsening or progression of this condition

## 2022-06-27 NOTE — PROGRESS NOTES
"Subjective:     CC:  The encounter diagnosis was URTI (acute upper respiratory infection).    HISTORY OF THE PRESENT ILLNESS: Patient is a 21 y.o. female. This pleasant patient is here today to establish care and discuss the following problems:    Problem   Urti (Acute Upper Respiratory Infection)    Symptoms started on Friday  H.A, Cough, congestion, phlegm (green), ear pain.   -Denies fever/chills at this time, may have had at onset but since resolved. +SOB, -CP, -Facial pain  -Feels more energetic but cough is worse  -Has taken multiple COVID test at home all negative  -+ environmental allergies worse lately-takes otc allergy meds-somewhat helpful  -[place]: Nyquil-somewhat helpful         Health Maintenance: Completed recommend follow-up exam for well woman and STI screening    ROS:   Review of Systems   Constitutional: Negative for chills, fever and malaise/fatigue.   Respiratory: Positive for cough, sputum production and shortness of breath. Negative for wheezing.    Cardiovascular: Negative for chest pain.         Objective:     Exam: BP (!) 86/60 (BP Location: Left arm, Patient Position: Sitting, BP Cuff Size: Adult)   Pulse 72   Temp 36.4 °C (97.6 °F) (Temporal)   Resp 12   Ht 1.542 m (5' 0.7\")   Wt 72.5 kg (159 lb 12.8 oz)   SpO2 96%  Body mass index is 30.49 kg/m².    Physical Exam  Constitutional:       General: She is not in acute distress.     Appearance: Normal appearance. She is not ill-appearing or toxic-appearing.   HENT:      Head: Normocephalic.      Right Ear: Tympanic membrane, ear canal and external ear normal.      Left Ear: Tympanic membrane, ear canal and external ear normal.      Nose: Nose normal.      Mouth/Throat:      Mouth: Mucous membranes are moist.      Pharynx: Oropharynx is clear.   Eyes:      Extraocular Movements: Extraocular movements intact.      Conjunctiva/sclera: Conjunctivae normal.      Pupils: Pupils are equal, round, and reactive to light.   Cardiovascular:      Rate " and Rhythm: Normal rate and regular rhythm.      Pulses: Normal pulses.      Heart sounds: Normal heart sounds. No murmur heard.  Pulmonary:      Effort: Pulmonary effort is normal. No respiratory distress.      Breath sounds: Normal breath sounds. No wheezing, rhonchi or rales.   Musculoskeletal:         General: Normal range of motion.      Cervical back: Normal range of motion and neck supple.   Lymphadenopathy:      Cervical: No cervical adenopathy.   Skin:     General: Skin is warm and dry.      Capillary Refill: Capillary refill takes less than 2 seconds.   Neurological:      General: No focal deficit present.      Mental Status: She is alert and oriented to person, place, and time.   Psychiatric:         Mood and Affect: Mood normal.         Behavior: Behavior normal.           Labs: No recent labs    Assessment & Plan: Medical Decision Making   21 y.o. female with the following -    Problem List Items Addressed This Visit     URTI (acute upper respiratory infection)     - This is a new self-limited condition  - Recommend vigorous hydration and use of Mucinex to help bring up excessive secretions  -Tessalon 100 mg prescribed 3 times daily as needed for cough  - Recommend NeilMed sinus rinse to use daily and then administer Flonase after rinse for allergy suppression  - If symptoms persist or worsen in the next 5 days please notify clinic  - ED precautions given and known for worsening or progression of this condition           Relevant Medications    benzonatate (TESSALON) 100 MG Cap          Differential diagnosis, natural history, supportive care, and indications for immediate follow-up discussed.  Shared decision making approach was utilized, and patient is amendable with plan of care.  Patient understands to return to clinic or go to the emergency department if symptoms worsen. All questions and concerns addressed.      Return in about 3 months (around 9/27/2022) for Wellness with Pap.    Please note that  this dictation was created using voice recognition software. I have made every reasonable attempt to correct obvious errors, but I expect that there are errors of grammar and possibly content that I did not discover before finalizing the note.

## 2022-07-07 ENCOUNTER — OFFICE VISIT (OUTPATIENT)
Dept: BEHAVIORAL HEALTH | Facility: CLINIC | Age: 22
End: 2022-07-07
Payer: COMMERCIAL

## 2022-07-07 DIAGNOSIS — F90.2 ADHD (ATTENTION DEFICIT HYPERACTIVITY DISORDER), COMBINED TYPE: Primary | ICD-10-CM

## 2022-07-07 PROBLEM — F43.23 ADJUSTMENT DISORDER WITH MIXED ANXIETY AND DEPRESSED MOOD: Status: RESOLVED | Noted: 2021-02-18 | Resolved: 2022-07-07

## 2022-07-07 PROBLEM — F41.1 ANXIETY AS ACUTE REACTION TO EXCEPTIONAL STRESS: Status: RESOLVED | Noted: 2021-02-18 | Resolved: 2022-07-07

## 2022-07-07 PROBLEM — F43.0 ANXIETY AS ACUTE REACTION TO EXCEPTIONAL STRESS: Status: RESOLVED | Noted: 2021-02-18 | Resolved: 2022-07-07

## 2022-07-07 PROCEDURE — 99214 OFFICE O/P EST MOD 30 MIN: CPT | Performed by: PSYCHIATRY & NEUROLOGY

## 2022-07-07 RX ORDER — METHYLPHENIDATE HYDROCHLORIDE 20 MG/1
20 CAPSULE, EXTENDED RELEASE ORAL EVERY MORNING
Qty: 30 CAPSULE | Refills: 0 | Status: SHIPPED | OUTPATIENT
Start: 2022-08-07 | End: 2022-08-26 | Stop reason: SDUPTHER

## 2022-07-07 RX ORDER — METHYLPHENIDATE HYDROCHLORIDE 20 MG/1
20 CAPSULE, EXTENDED RELEASE ORAL EVERY MORNING
Qty: 30 CAPSULE | Refills: 0 | Status: SHIPPED | OUTPATIENT
Start: 2022-07-07 | End: 2022-08-06

## 2022-07-07 NOTE — PROGRESS NOTES
IN-PERSON SESSION IN CLINIC    PSYCHIATRY FOLLOW-UP NOTE      Name: Eda Rothman  MRN: [id number]  : 2000  Age: 21 y.o.  Date of assessment: 2022  PCP: Delfin Shore D.N.P.  Persons in attendance: Patient      REASON FOR VISIT/CHIEF COMPLAINT (as stated by Patient):  Eda Rothman is a 21 y.o.,  or  female, attending follow-up appointment for ADHD and anxiety management.      HISTORY OF PRESENT ILLNESS:  Eda Rothman is a 21 y.o. old female with ADHD and anxiety comes in today for follow up. Patient was last seen 1 month ago, and following treatment planning recommendations were done:  · Stop Adderall XR  · Add Ritalin LA 20 mg daily for ADHD. 30 tabs with 0 refill given  · Controlled medication treatment agreement signed today (22)  · Initiate referral for psychotherapy for ADHD and anxiety management.  · Information provided on ADHD coaches for ADHD management.    Patient is compliant with addition of Ritalin with no acute side effect and has seen marked improvement in focus and anxiety.  Patient agreed with not titrating medications further.  Patient will begin school during the last week of August and agreed with making a follow-up with me during second week of September to assess if increase in Ritalin is indicated.    Patient's father prefer her not on medication for a long time and information regarding ADHD coaches was provided in last session but the patient has not called him yet.  Patient agreed with reaching daily ADHD coaches and working with them for ADHD management.    CURRENT MEDICATIONS:  Current Outpatient Medications   Medication Sig Dispense Refill   • benzonatate (TESSALON) 100 MG Cap Take 1 Capsule by mouth 3 times a day as needed for Cough. 60 Capsule 0   • fluticasone (FLONASE) 50 MCG/ACT nasal spray Administer 1 Spray into affected nostril(S) every day.     • loratadine (CLARITIN) 10 MG Tab Take 10 mg by mouth every day. Indications:  Zeynep       No current facility-administered medications for this visit.       MEDICAL HISTORY  Past Medical History:   Diagnosis Date   • ADHD    • Allergy      Past Surgical History:   Procedure Laterality Date   • LUMPECTOMY         PAST PSYCHIATRIC HISTORY  Prior psychiatric hospitalization: no  Prior Self harm/suicide attempt: no  Prior Diagnosis:   · ADHD, Combined Type: which was treated when she was in high school with the stimulant methylphenidate     PAST PSYCHIATRIC MEDICATIONS  · Adderall  · Ritalin: most effective one      FAMILY HISTORY  None     SUBSTANCE USE HISTORY:  Denies     SOCIAL HISTORY  Childhood: Born in [place], [place] and raised in [place], [place]  Currently at [employer] and working as well  Lives with parents  Not in relationship  No kids  No trauma or abuse history      REVIEW OF SYSTEMS:        Constitutional negative   Eyes negative   Ears/Nose/Mouth/Throat negative   Cardiovascular negative   Respiratory negative   Gastrointestinal negative   Genitourinary negative   Muscular negative   Integumentary negative   Neurological negative   Endocrine negative   Hematologic/Lymphatic negative     PHYSICAL EXAMINAION:  Vital signs: There were no vitals taken for this visit.  Musculoskeletal: Normal gait.   Abnormal movements: none      MENTAL STATUS EXAMINATION      General:   - Grooming and hygiene: Casual,   - Apparent distress: none,   - Behavior: Calm  - Eye Contact:  Good,   - no psychomotor agitation or retardation    - Participation: Active verbal participation  Orientation: Alert and Fully Oriented to person, place and time  Mood: Euthymic  Affect: Flexible,  Thought Process: Logical and Goal-directed  Thought Content: Denies suicidal or homicidal ideations, intent or plan Within normal limits  Perception: Denies auditory or visual hallucinations. No delusions noted Within normal limits  Attention span and concentration: Intact   Speech:Rate within normal limits and Volume within normal  limits  Language: Appropriate   Insight: Good  Judgment: Good  Recent and remote memory: No gross evidence of memory deficits        DEPRESSION SCREENING:  Depression Screen (PHQ-2/PHQ-9) 5/27/2021 10/18/2021 4/5/2022   PHQ-2 Total Score 0 - -   PHQ-2 Total Score - 0 0   PHQ-9 Total Score 10 - -   PHQ-9 Total Score - - -       Interpretation of PHQ-9 Total Score   Score Severity   1-4 No Depression   5-9 Mild Depression   10-14 Moderate Depression   15-19 Moderately Severe Depression   20-27 Severe Depression    CURRENT RISK:       Suicidal: Low       Homicidal: Low       Self-Harm: Low       Relapse: Low       Crisis Safety Plan Reviewed Not Indicated       If evidence of imminent risk is present, intervention/plan:      MEDICAL RECORDS/LABS/DIAGNOSTIC TESTS REVIEWED:  No new lab since last visit     [place] [facility] records -   Reviewed     PLAN:  (1) ADHD; (2) Anxiety   · Improving  · Continue Ritalin LA 20 mg daily for ADHD.  · Controlled medication treatment agreement signed on 6/2/22.  · Referral placed for psychotherapy in past session for ADHD and anxiety management.  · Information provided on ADHD coaches for ADHD management.  · Medication options, alternatives (including no medications) and medication risks/benefits/side effects were discussed in detail.  · Explained importance of contraceptive measures while on psychotropic medications, educated to let provider know if ever pregnant or wanting to become pregnant. Verbalized understanding.  · The patient was advised to call, message provider on [facility], or come in to the clinic if symptoms worsen or if any future questions/issues regarding their medications arise; the patient verbalized understanding and agreement.    · The patient was educated to call 911, call the suicide hotline, or go to local ER if having thoughts of suicide or homicide; verbalized understanding.      Billing Coding based on:  60183 based on [facility]    Return to clinic in 6 weeks or sooner if  symptoms worsen.  Next Appointment: instruction provided on how to make the next appointment.     The proposed treatment plan was discussed with the patient who was provided the opportunity to ask questions and make suggestions regarding alternative treatment. Patient verbalized understanding and expressed agreement with the plan.       Ivan Briggs M.D.  07/07/22    This note was created using voice recognition software (Dragon). The accuracy of the dictation is limited by the abilities of the software. I have reviewed the note prior to signing, however some errors in grammar and context are still possible. If you have any questions related to this note please do not hesitate to contact our office.

## 2022-08-06 DIAGNOSIS — L50.9 FULL BODY HIVES: ICD-10-CM

## 2022-08-06 RX ORDER — HYDROXYZINE HYDROCHLORIDE 25 MG/1
25 TABLET, FILM COATED ORAL 3 TIMES DAILY PRN
Qty: 30 TABLET | Refills: 0 | Status: SHIPPED | OUTPATIENT
Start: 2022-08-06 | End: 2023-04-05

## 2022-08-06 NOTE — ASSESSMENT & PLAN NOTE
-Acute condition, active  -Continue Prednisone as prescribed  -Atarax 25 mg TID prn itching  -Referral to allergist  -ED precautions for return or worsening of this condition

## 2022-08-26 DIAGNOSIS — F90.2 ADHD (ATTENTION DEFICIT HYPERACTIVITY DISORDER), COMBINED TYPE: ICD-10-CM

## 2022-08-26 RX ORDER — METHYLPHENIDATE HYDROCHLORIDE 20 MG/1
20 CAPSULE, EXTENDED RELEASE ORAL EVERY MORNING
Qty: 30 CAPSULE | Refills: 0 | Status: SHIPPED | OUTPATIENT
Start: 2022-08-26 | End: 2022-09-25

## 2022-11-11 ENCOUNTER — TELEMEDICINE (OUTPATIENT)
Dept: BEHAVIORAL HEALTH | Facility: CLINIC | Age: 22
End: 2022-11-11
Payer: COMMERCIAL

## 2022-11-11 DIAGNOSIS — F90.2 ADHD (ATTENTION DEFICIT HYPERACTIVITY DISORDER), COMBINED TYPE: ICD-10-CM

## 2022-11-11 PROCEDURE — 99214 OFFICE O/P EST MOD 30 MIN: CPT | Mod: GT | Performed by: PSYCHIATRY & NEUROLOGY

## 2022-11-11 NOTE — PROGRESS NOTES
This evaluation was conducted via Zoom using secure and encrypted videoconferencing technology. The [facility] was [facility].    The patient's identity was confirmed and verbal consent was obtained for this virtual visit.      PSYCHIATRY FOLLOW-UP NOTE      Name: Eda Rothman  MRN: [id number]  : 2000  Age: 22 y.o.  Date of assessment: 2022  PCP: Delfin Shore D.N.P.  Persons in attendance: Patient      REASON FOR VISIT/CHIEF COMPLAINT (as stated by Patient):  Eda Rothman is a 22 y.o.,  or  female, attending follow-up appointment for ADHD, mood and anxiety management.      HISTORY OF PRESENT ILLNESS:  Eda Rothman is a 22 y.o. old female with ADHD and anxiety comes in today for follow up. Patient was last seen 3 months ago, and following treatment planning recommendations were done:  Continue Ritalin LA 20 mg daily for ADHD.  Controlled medication treatment agreement signed on 22.  Referral placed for psychotherapy in past session for ADHD and anxiety management.  Information provided on ADHD coaches for ADHD management.      Patient is no longer on Ritalin as Ritalin caused worsening of nausea and vomiting.  Patient started using caffeine only 1 cup and occasionally energy drink and this has caused marked improvement in focus and she describes her last semester as good.  Agreed with not adding medication but to read books on strengthening executed functioning and using caffeine as needed.  Patient agreed with making a follow-up with me if this approach is not helpful and in that case we will consider Vyvanse.      CURRENT MEDICATIONS:  Current Outpatient Medications   Medication Sig Dispense Refill    hydrOXYzine HCl (ATARAX) 25 MG Tab Take 1 Tablet by mouth 3 times a day as needed for Itching. 30 Tablet 0    benzonatate (TESSALON) 100 MG Cap Take 1 Capsule by mouth 3 times a day as needed for Cough. 60 Capsule 0    fluticasone  (FLONASE) 50 MCG/ACT nasal spray Administer 1 Spray into affected nostril(S) every day.      loratadine (CLARITIN) 10 MG Tab Take 10 mg by mouth every day. Indications: Hayfever       No current facility-administered medications for this visit.       MEDICAL HISTORY  Past Medical History:   Diagnosis Date    ADHD     Allergy      Past Surgical History:   Procedure Laterality Date    LUMPECTOMY         PAST PSYCHIATRIC HISTORY  Prior psychiatric hospitalization: no  Prior Self harm/suicide attempt: no  Prior Diagnosis:   ADHD, Combined Type: which was treated when she was in high school with the stimulant methylphenidate     PAST PSYCHIATRIC MEDICATIONS  Adderall  Ritalin: most effective one      FAMILY HISTORY  None     SUBSTANCE USE HISTORY:  Denies     SOCIAL HISTORY  Childhood: Born in [place], [place] and raised in [place], [place]  Currently at [employer] and working as well  Lives with parents  Not in relationship  No kids  No trauma or abuse history      REVIEW OF SYSTEMS:        Constitutional negative   Eyes negative   Ears/Nose/Mouth/Throat negative   Cardiovascular negative   Respiratory negative   Gastrointestinal negative   Genitourinary negative   Muscular negative   Integumentary negative   Neurological negative   Endocrine negative   Hematologic/Lymphatic negative     PHYSICAL EXAMINAION:  Vital signs: There were no vitals taken for this visit.  Musculoskeletal: Normal gait.   Abnormal movements: none      MENTAL STATUS EXAMINATION      General:   - Grooming and hygiene: Casual,   - Apparent distress: none,   - Behavior: Calm  - Eye Contact:  Good,   - no psychomotor agitation or retardation    - Participation: Active verbal participation  Orientation: Alert and Fully Oriented to person, place and time  Mood: Euthymic  Affect: Flexible and Full range,  Thought Process: Logical and Goal-directed  Thought Content: Denies suicidal or homicidal ideations, intent or plan   Perception: Denies auditory or visual  hallucinations. No delusions noted   Attention span and concentration: Intact   Speech:Rate within normal limits and Volume within normal limits  Language: Appropriate   Insight: Good  Judgment: Good  Recent and remote memory: No gross evidence of memory deficits        DEPRESSION SCREENIN/27/2021    10:58 AM 10/18/2021    11:30 AM 2022     2:30 PM   Depression Screen (PHQ-2/PHQ-9)   PHQ-2 Total Score 0     PHQ-2 Total Score  0 0   PHQ-9 Total Score 10         Interpretation of PHQ-9 Total Score   Score Severity   1-4 No Depression   5-9 Mild Depression   10-14 Moderate Depression   15-19 Moderately Severe Depression   20-27 Severe Depression    CURRENT RISK:       Suicidal: Low       Homicidal: Low       Self-Harm: Low       Relapse: Low       Crisis Safety Plan Reviewed Not Indicated       If evidence of imminent risk is present, intervention/plan:      MEDICAL RECORDS/LABS/DIAGNOSTIC TESTS REVIEWED:  No new lab since last visit     [place]  records -   Reviewed     PLAN:  (1) ADHD; (2) Anxiety   Improving  Consider Vyvanse if needed in the future for ADHD.  Controlled medication treatment agreement signed on 22.  Referral placed for psychotherapy in past session for ADHD and anxiety management.  Information provided on ADHD coaches for ADHD management.  Medication options, alternatives (including no medications) and medication risks/benefits/side effects were discussed in detail.  Explained importance of contraceptive measures while on psychotropic medications, educated to let provider know if ever pregnant or wanting to become pregnant. Verbalized understanding.  The patient was advised to call, message provider on [facility], or come in to the clinic if symptoms worsen or if any future questions/issues regarding their medications arise; the patient verbalized understanding and agreement.    The patient was educated to call 911, call the suicide hotline, or go to local ER if having thoughts of  suicide or homicide; verbalized understanding.    Billing Coding based on:  71317 based on [facility]    Next Appointment: instruction provided on how to make the next appointment.     The proposed treatment plan was discussed with the patient who was provided the opportunity to ask questions and make suggestions regarding alternative treatment. Patient verbalized understanding and expressed agreement with the plan.       Ivan Briggs M.D.  11/11/22    This note was created using voice recognition software (Dragon). The accuracy of the dictation is limited by the abilities of the software. I have reviewed the note prior to signing, however some errors in grammar and context are still possible. If you have any questions related to this note please do not hesitate to contact our office.

## 2022-11-11 NOTE — LETTER
November 11, 2022    To Whom It May Concern:         This is confirmation that Eda Rothman attended her scheduled appointment with Ivan Briggs M.D. on 11/11/22. She had a side effect from the treatment prescribed me and this resulted in her taking time off from school on Oct 12, 2022.     Sincerely,    Ivan Briggs M.D.  [phone]

## 2023-02-23 DIAGNOSIS — F90.2 ADHD (ATTENTION DEFICIT HYPERACTIVITY DISORDER), COMBINED TYPE: ICD-10-CM

## 2023-02-23 RX ORDER — METHYLPHENIDATE HYDROCHLORIDE 20 MG/1
20 CAPSULE, EXTENDED RELEASE ORAL EVERY MORNING
Qty: 30 CAPSULE | Refills: 0 | Status: SHIPPED | OUTPATIENT
Start: 2023-02-23 | End: 2023-03-25

## 2023-04-05 ENCOUNTER — OFFICE VISIT (OUTPATIENT)
Dept: MEDICAL GROUP | Facility: PHYSICIAN GROUP | Age: 23
End: 2023-04-05
Payer: COMMERCIAL

## 2023-04-05 ENCOUNTER — HOSPITAL ENCOUNTER (OUTPATIENT)
Facility: MEDICAL CENTER | Age: 23
End: 2023-04-05
Payer: COMMERCIAL

## 2023-04-05 VITALS
SYSTOLIC BLOOD PRESSURE: 90 MMHG | BODY MASS INDEX: 33.89 KG/M2 | HEART RATE: 82 BPM | TEMPERATURE: 97.6 F | HEIGHT: 60 IN | WEIGHT: 172.6 LBS | DIASTOLIC BLOOD PRESSURE: 70 MMHG | OXYGEN SATURATION: 96 %

## 2023-04-05 DIAGNOSIS — Z30.09 FAMILY PLANNING: ICD-10-CM

## 2023-04-05 DIAGNOSIS — Z01.419 WELL WOMAN EXAM: ICD-10-CM

## 2023-04-05 LAB — AMBIGUOUS DTTM AMBI4: NORMAL

## 2023-04-05 PROCEDURE — 88175 CYTOPATH C/V AUTO FLUID REDO: CPT

## 2023-04-05 PROCEDURE — 99395 PREV VISIT EST AGE 18-39: CPT

## 2023-04-05 PROCEDURE — 87591 N.GONORRHOEAE DNA AMP PROB: CPT

## 2023-04-05 PROCEDURE — 87491 CHLMYD TRACH DNA AMP PROBE: CPT

## 2023-04-05 RX ORDER — METHYLPHENIDATE HYDROCHLORIDE 20 MG/1
20 TABLET ORAL DAILY
COMMUNITY
End: 2023-05-03

## 2023-04-05 ASSESSMENT — PATIENT HEALTH QUESTIONNAIRE - PHQ9: CLINICAL INTERPRETATION OF PHQ2 SCORE: 0

## 2023-04-05 ASSESSMENT — LIFESTYLE VARIABLES
DURING THE PAST 12 MONTHS, ON HOW MANY DAYS DID YOU USE ANY TOBACCO OR NICOTINE PRODUCTS: 0
HAVE YOU EVER GOTTEN IN TROUBLE WHILE YOU WERE USING ALCOHOL OR DRUGS: NO
DO YOU EVER FORGET THINGS YOU DID WHILE USING ALCOHOL OR DRUGS: NO
DURING THE PAST 12 MONTHS, ON HOW MANY DAYS DID YOU USE ANY MARIJUANA: 6
DO YOUR FAMILY OR FRIENDS EVER TELL YOU THAT YOU SHOULD CUT DOWN ON YOUR DRINKING OR DRUG USE: NO
DO YOU EVER USE ALCOHOL OR DRUGS TO RELAX, FEEL BETTER ABOUT YOURSELF, OR FIT IN: NO
PART A TOTAL SCORE: 13
DURING THE PAST 12 MONTHS, ON HOW MANY DAYS DID YOU USE ANYTHING ELSE TO GET HIGH: 0
HAVE YOU EVER RIDDEN IN A CAR DRIVEN BY SOMEONE WHO WAS HIGH OR HAD BEEN USING ALCOHOL OR DRUGS: NO
DURING THE PAST 12 MONTHS, ON HOW MANY DAYS DID YOU DRINK MORE THAN A FEW SIPS OF BEER, WINE, OR ANY DRINK CONTAINING ALCOHOL: 7
DO YOU EVER USE ALCOHOL OR DRUGS WHILE YOU ARE BY YOURSELF ALONE: NO

## 2023-04-05 NOTE — PROGRESS NOTES
Subjective:     CC:   Chief Complaint   Patient presents with    Gynecologic Exam     Pap, this is her first pap    Contraception     Talk about IUD and to get something in the mean time       HPI:   Eda Rothman is a 22 y.o. female who presents for annual exam    Patient has GYN provider: No   Last Pap Smear: none   H/O Abnormal Pap: No  Last Mammogram: none  Last Bone Density Test: non3  Last Colorectal Cancer Screening: none  Last Tdap: UTD  Received HPV series: Yes    Exercise: strenuous regular exercise, aerobic > 3 hours a week  Diet: Improving, meals are smaller      No LMP recorded.  Hx STDs: No  Birth control: barrier, interested in Copper IUD  Menses every month with 5 days with moderate bleeding.  Reports moderate cramping and does take OTC analgesics for cramps.  No significant bloating/fluid retention, pelvic pain, or dyspareunia. No abnormal vaginal discharge.  No breast tenderness, mass, nipple discharge, changes in size or contour, or abnormal cyclic discomfort.    OB History    Para Term  AB Living   0 0 0 0 0 0   SAB IAB Ectopic Molar Multiple Live Births   0 0 0 0 0 0      She  reports that she is not currently sexually active.    She  has a past medical history of ADHD and Allergy.    She has no past medical history of ASTHMA or Diabetes.  She  has a past surgical history that includes lumpectomy.    Family History   Problem Relation Age of Onset    No Known Problems Mother     Adjustment disorder with mixed anxiety and depressed mood Brother     Cancer Maternal Grandmother         some type of female cancer     Social History     Tobacco Use    Smoking status: Never    Smokeless tobacco: Never   Vaping Use    Vaping Use: Never used   Substance Use Topics    Alcohol use: Not Currently    Drug use: Not Currently       Patient Active Problem List    Diagnosis Date Noted    Full body hives 2022    URTI (acute upper respiratory infection) 2022    Encounter for  Depo-Provera contraception 08/31/2021    Nasal congestion 07/27/2021    Tics of organic origin 02/18/2021    Birth control counseling 01/25/2021    History of bilateral breast reduction surgery 11/06/2020    Acne vulgaris 11/06/2020    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      fluticasone (FLONASE) 50 MCG/ACT nasal spray Administer 1 Spray into affected nostril(S) every day.      loratadine (CLARITIN) 10 MG Tab Take 10 mg by mouth every day. Indications: Hayfever       No current facility-administered medications for this visit.     Allergies   Allergen Reactions    Cats Claw, Uncaria Tomentosa      Itch nose, puffy itchy eyes.       Review of Systems   Constitutional: Negative for fever, chills and malaise/fatigue.   HENT: Negative for congestion.    Eyes: Negative for pain.   Respiratory: Negative for cough and shortness of breath.    Cardiovascular: Negative for chest pain and leg swelling.   Gastrointestinal: Negative for nausea, vomiting, abdominal pain and diarrhea.   Genitourinary: Negative for dysuria and hematuria.   Skin: Negative for rash.   Neurological: Negative for dizziness, focal weakness and headaches.   Endo/Heme/Allergies: Does not bruise/bleed easily.   Psychiatric/Behavioral: Negative for depression.  The patient is not nervous/anxious.      Objective:   BP 90/70 (BP Location: Left arm, Patient Position: Sitting, BP Cuff Size: Adult)   Pulse 82   Temp 36.4 °C (97.6 °F) (Temporal)   Ht 1.524 m (5')   Wt 78.3 kg (172 lb 9.6 oz)   SpO2 96%   BMI 33.71 kg/m²     Wt Readings from Last 4 Encounters:   04/05/23 78.3 kg (172 lb 9.6 oz)   06/27/22 72.5  kg (159 lb 12.8 oz)   04/05/22 71.7 kg (158 lb)   10/18/21 69.9 kg (154 lb)       Physical Exam:  Constitutional: Well-developed and well-nourished. Not diaphoretic. No distress.   Skin: Skin is warm and dry. No rash noted.  Head: Atraumatic without lesions.  Eyes: Conjunctivae and extraocular motions are normal. Pupils are equal, round, and reactive to light. No scleral icterus.   Ears:  External ears unremarkable. Tympanic membranes clear and intact.  Nose: Nares patent. Septum midline. Turbinates without erythema nor edema. No discharge.   Mouth/Throat: Tongue normal. Oropharynx is clear and moist. Posterior pharynx without erythema or exudates.  Neck: Supple, trachea midline. Normal range of motion. No thyromegaly present. No lymphadenopathy--cervical or supraclavicular.  Cardiovascular: Regular rate and rhythm, S1 and S2 without murmur, rubs, or gallops.    Respiratory: Effort normal. Clear to auscultation throughout. No adventitious sounds.     Abdomen: Soft, non tender, and without distention. Active bowel sounds in all four quadrants. No rebound, guarding, masses or HSM.  : Perineum and external genitalia normal without rash. Vagina with bloody discharge. Cervix without visible lesions or discharge. Bimanual exam without adnexal masses or cervical motion tenderness.  Extremities: No cyanosis, clubbing, erythema, nor edema. Distal pulses intact and symmetric.   Musculoskeletal: All major joints AROM full in all directions without pain.  Neurological: Alert and oriented x 3. Grossly non-focal. Strength and sensation grossly intact. DTRs 2+/3 and symmetric.   Psychiatric:  Behavior, mood, and affect are appropriate.    Assessment and Plan:     1. Well woman exam  - Thinprep Rflx HPV ASC and above w/CTNG; Future  - Chlamydia/GC, PCR (Urine); Future    2. Family planning  - Referral to Gynecology    Other orders  - methylphenidate (RITALIN) 20 MG tablet; Take 20 mg by mouth every day.      Health maintenance:   UTD   Labs per orders  Immunizations per orders  Patient counseled about skin care, diet, supplements, and exercise.  Discussed  breast self exam, mammography screening, STD prevention, HIV risk factors and prevention, feminine hygiene, use and side effects of OCP's, family planning choices, adequate intake of calcium and vitamin D, diet and exercise, colorectal cancer screening     Follow-up: Return in about 1 year (around 4/5/2024).

## 2023-04-06 LAB
C TRACH DNA GENITAL QL NAA+PROBE: NEGATIVE
C TRACH DNA SPEC QL NAA+PROBE: NEGATIVE
CYTOLOGY REG CYTOL: NORMAL
N GONORRHOEA DNA GENITAL QL NAA+PROBE: NEGATIVE
N GONORRHOEA DNA SPEC QL NAA+PROBE: NEGATIVE
SPECIMEN SOURCE: NORMAL
SPECIMEN SOURCE: NORMAL

## 2023-05-03 ENCOUNTER — OFFICE VISIT (OUTPATIENT)
Dept: BEHAVIORAL HEALTH | Facility: CLINIC | Age: 23
End: 2023-05-03
Payer: COMMERCIAL

## 2023-05-03 DIAGNOSIS — F90.2 ADHD (ATTENTION DEFICIT HYPERACTIVITY DISORDER), COMBINED TYPE: ICD-10-CM

## 2023-05-03 PROCEDURE — 99214 OFFICE O/P EST MOD 30 MIN: CPT | Performed by: PSYCHIATRY & NEUROLOGY

## 2023-05-03 PROCEDURE — 90833 PSYTX W PT W E/M 30 MIN: CPT | Performed by: PSYCHIATRY & NEUROLOGY

## 2023-05-03 RX ORDER — LISDEXAMFETAMINE DIMESYLATE CAPSULES 40 MG/1
40 CAPSULE ORAL DAILY
Qty: 30 CAPSULE | Refills: 0 | Status: SHIPPED | OUTPATIENT
Start: 2023-05-03 | End: 2023-06-19 | Stop reason: SDUPTHER

## 2023-05-03 NOTE — PROGRESS NOTES
PSYCHIATRY FOLLOW-UP NOTE      Name: Eda Rothman  MRN: [id number]  : 2000  Age: 22 y.o.  Date of assessment: 5/3/2023  PCP: Delfin Shore D.N.P.  Persons in attendance: Patient      REASON FOR VISIT/CHIEF COMPLAINT (as stated by Patient):  Eda Rothman is a 22 y.o.,  or  female, attending follow-up appointment for ADHD, mood and anxiety management.      HISTORY OF PRESENT ILLNESS:  Eda Rothman is a 22 y.o. old female with ADHD and anxiety comes in today for follow up. Patient was last seen 5 months ago, and following treatment planning recommendations were done:  Consider Vyvanse if needed in the future for ADHD.  Controlled medication treatment agreement signed on 22.  Referral placed for psychotherapy in past session for ADHD and anxiety management.  Information provided on ADHD coaches for ADHD management.      Patient is compliant with the Ritalin 20 mg dose and no longer struggling with nausea.  Patient has seen decline in her GPA this year and not doing well from an attention standpoint which is causing anxiety and confidence issues.  Agreed with switching Ritalin to Vyvanse for affective control of ADHD.    Time spent in psychotherapy: 16 min  Most session was dedicated to letting patient express her feelings related to recent decline in her grades due to untreated ADHD.  Validation was provided for appropriate emotional responses.  Importance of working on improving executive functioning skills was emphasized.  Later part of the session was dedicated to active listening and implementing supportive psychotherapy skills.      CURRENT MEDICATIONS:  Current Outpatient Medications   Medication Sig Dispense Refill    methylphenidate (RITALIN) 20 MG tablet Take 20 mg by mouth every day.      fluticasone (FLONASE) 50 MCG/ACT nasal spray Administer 1 Spray into affected nostril(S) every day.      loratadine (CLARITIN) 10 MG Tab Take 10 mg by mouth every  day. Indications: Hayfever       No current facility-administered medications for this visit.       MEDICAL HISTORY  Past Medical History:   Diagnosis Date    ADHD     Allergy      Past Surgical History:   Procedure Laterality Date    LUMPECTOMY         PAST PSYCHIATRIC HISTORY  Prior psychiatric hospitalization: no  Prior Self harm/suicide attempt: no  Prior Diagnosis:   ADHD, Combined Type: which was treated when she was in high school with the stimulant methylphenidate     PAST PSYCHIATRIC MEDICATIONS  Adderall  Ritalin: most effective one      FAMILY HISTORY  None     SUBSTANCE USE HISTORY:  Denies     SOCIAL HISTORY  Childhood: Born in [place], [place] and raised in [place], [place]  Currently at [employer] and working as well  Lives with parents  Not in relationship  No kids  No trauma or abuse history      REVIEW OF SYSTEMS:        Constitutional negative   Eyes negative   Ears/Nose/Mouth/Throat negative   Cardiovascular negative   Respiratory negative   Gastrointestinal negative   Genitourinary negative   Muscular negative   Integumentary negative   Neurological negative   Endocrine negative   Hematologic/Lymphatic negative     PHYSICAL EXAMINAION:  Vital signs: There were no vitals taken for this visit.  Musculoskeletal: Normal gait.   Abnormal movements: none      MENTAL STATUS EXAMINATION      General:   - Grooming and hygiene: Casual,   - Apparent distress: none,   - Behavior: Calm  - Eye Contact:  Good,   - no psychomotor agitation or retardation    - Participation: Active verbal participation  Orientation: Alert and Fully Oriented to person, place and time  Mood: Euthymic  Affect: Flexible and Full range,  Thought Process: Logical and Goal-directed  Thought Content: Denies suicidal or homicidal ideations, intent or plan   Perception: Denies auditory or visual hallucinations. No delusions noted   Attention span and concentration: fair  Speech:Rate within normal limits and Volume within normal limits  Language:  Appropriate   Insight: Good  Judgment: Good  Recent and remote memory: No gross evidence of memory deficits        DEPRESSION SCREENING:      10/18/2021    11:30 AM 4/5/2022     2:30 PM 4/5/2023    10:00 AM   Depression Screen (PHQ-2/PHQ-9)   PHQ-2 Total Score 0 0 0       Interpretation of PHQ-9 Total Score   Score Severity   1-4 No Depression   5-9 Mild Depression   10-14 Moderate Depression   15-19 Moderately Severe Depression   20-27 Severe Depression    CURRENT RISK:       Suicidal: Low       Homicidal: Low       Self-Harm: Low       Relapse: Low       Crisis Safety Plan Reviewed Not Indicated       If evidence of imminent risk is present, intervention/plan:      MEDICAL RECORDS/LABS/DIAGNOSTIC TESTS REVIEWED:  No new lab since last visit     [place]  records -   Reviewed     PLAN:  (1) ADHD; (2) Anxiety   Persistence of inattention  Stop Ritalin LA 20 mg  Add Vyvanse 40 mg daily for ADHD.  Controlled medication treatment agreement signed on 6/2/22.  Referral placed for psychotherapy in past session for ADHD and anxiety management.  Information provided on ADHD coaches for ADHD management.  Medication options, alternatives (including no medications) and medication risks/benefits/side effects were discussed in detail.  Explained importance of contraceptive measures while on psychotropic medications, educated to let provider know if ever pregnant or wanting to become pregnant. Verbalized understanding.  The patient was advised to call, message provider on [employer], or come in to the clinic if symptoms worsen or if any future questions/issues regarding their medications arise; the patient verbalized understanding and agreement.    The patient was educated to call 911, call the suicide hotline, or go to local ER if having thoughts of suicide or homicide; verbalized understanding.    Billing Coding based on:  90348 based on [facility]  36972: based on psychotherapy timing    Return to clinic in 1 month or sooner if symptoms  worsen.  Next Appointment: instruction provided on how to make the next appointment.     The proposed treatment plan was discussed with the patient who was provided the opportunity to ask questions and make suggestions regarding alternative treatment. Patient verbalized understanding and expressed agreement with the plan.       Ivan Briggs M.D.  05/03/23    This note was created using voice recognition software (Dragon). The accuracy of the dictation is limited by the abilities of the software. I have reviewed the note prior to signing, however some errors in grammar and context are still possible. If you have any questions related to this note please do not hesitate to contact our office.

## 2023-06-18 ENCOUNTER — PATIENT MESSAGE (OUTPATIENT)
Dept: BEHAVIORAL HEALTH | Facility: CLINIC | Age: 23
End: 2023-06-18
Payer: COMMERCIAL

## 2023-06-18 DIAGNOSIS — F90.2 ADHD (ATTENTION DEFICIT HYPERACTIVITY DISORDER), COMBINED TYPE: ICD-10-CM

## 2023-06-19 NOTE — PATIENT COMMUNICATION
Received request via: Patient    Was the patient seen in the last year in this department? Yes    Does the patient have an active prescription (recently filled or refills available) for medication(s) requested? No    Does the patient have care home Plus and need 100 day supply (blood pressure, diabetes and cholesterol meds only)? Medication is not for cholesterol, blood pressure or diabetes and Patient does not have SCP

## 2023-06-20 RX ORDER — LISDEXAMFETAMINE DIMESYLATE CAPSULES 40 MG/1
40 CAPSULE ORAL DAILY
Qty: 30 CAPSULE | Refills: 0 | Status: SHIPPED | OUTPATIENT
Start: 2023-06-20 | End: 2023-07-20

## 2023-08-22 ENCOUNTER — APPOINTMENT (OUTPATIENT)
Dept: MEDICAL GROUP | Facility: PHYSICIAN GROUP | Age: 23
End: 2023-08-22
Payer: COMMERCIAL

## 2023-09-12 ENCOUNTER — APPOINTMENT (OUTPATIENT)
Dept: MEDICAL GROUP | Facility: PHYSICIAN GROUP | Age: 23
End: 2023-09-12
Payer: COMMERCIAL

## 2023-11-29 ENCOUNTER — OFFICE VISIT (OUTPATIENT)
Dept: MEDICAL GROUP | Facility: PHYSICIAN GROUP | Age: 23
End: 2023-11-29
Payer: COMMERCIAL

## 2023-11-29 VITALS
OXYGEN SATURATION: 94 % | HEART RATE: 74 BPM | HEIGHT: 59 IN | WEIGHT: 175.6 LBS | SYSTOLIC BLOOD PRESSURE: 90 MMHG | TEMPERATURE: 98.1 F | DIASTOLIC BLOOD PRESSURE: 60 MMHG | BODY MASS INDEX: 35.4 KG/M2

## 2023-11-29 DIAGNOSIS — Z23 NEED FOR VACCINATION: ICD-10-CM

## 2023-11-29 DIAGNOSIS — Z11.3 SCREENING EXAMINATION FOR SEXUALLY TRANSMITTED DISEASE: ICD-10-CM

## 2023-11-29 DIAGNOSIS — E66.9 OBESITY (BMI 30-39.9): ICD-10-CM

## 2023-11-29 PROCEDURE — 90472 IMMUNIZATION ADMIN EACH ADD: CPT

## 2023-11-29 PROCEDURE — 3078F DIAST BP <80 MM HG: CPT

## 2023-11-29 PROCEDURE — 90715 TDAP VACCINE 7 YRS/> IM: CPT

## 2023-11-29 PROCEDURE — 90686 IIV4 VACC NO PRSV 0.5 ML IM: CPT

## 2023-11-29 PROCEDURE — 99213 OFFICE O/P EST LOW 20 MIN: CPT | Mod: 25

## 2023-11-29 PROCEDURE — 3074F SYST BP LT 130 MM HG: CPT

## 2023-11-29 PROCEDURE — 90471 IMMUNIZATION ADMIN: CPT

## 2023-11-29 ASSESSMENT — ENCOUNTER SYMPTOMS
ABDOMINAL PAIN: 0
DIZZINESS: 0
SHORTNESS OF BREATH: 0
BLURRED VISION: 0
VOMITING: 0
FEVER: 0
NAUSEA: 0
MYALGIAS: 0
WEAKNESS: 0
CHILLS: 0
DIARRHEA: 0
CONSTIPATION: 0
COUGH: 0
WEIGHT LOSS: 0
HEADACHES: 0

## 2023-11-29 NOTE — PROGRESS NOTES
"Subjective:     CC: Vaccines/STI check    HPI:  Eda presents today with    Problem   Obesity (Bmi 30-39.9)    Wt: 175 lbs, 178 is her heaviest  BMI: 35.47  Goal: 150  Exercise consist of snowboarding daily  Diet: Grades diet as a \"D\", has not been eating as healthy  Motivated to make dietary improvements.            ROS:  Review of Systems   Constitutional:  Negative for chills, fever, malaise/fatigue and weight loss.   Eyes:  Negative for blurred vision.   Respiratory:  Negative for cough and shortness of breath.    Cardiovascular:  Negative for chest pain.   Gastrointestinal:  Negative for abdominal pain, constipation, diarrhea, nausea and vomiting.   Musculoskeletal:  Negative for myalgias.   Neurological:  Negative for dizziness, weakness and headaches.       Objective:     Exam:  BP 90/60 (BP Location: Left arm, Patient Position: Sitting, BP Cuff Size: Adult)   Pulse 74   Temp 36.7 °C (98.1 °F) (Temporal)   Ht 1.499 m (4' 11\")   Wt 79.7 kg (175 lb 9.6 oz)   SpO2 94%   BMI 35.47 kg/m²  Body mass index is 35.47 kg/m².    Physical Exam  Constitutional:       General: She is not in acute distress.     Appearance: Normal appearance. She is not ill-appearing or toxic-appearing.   HENT:      Head: Normocephalic.   Eyes:      Conjunctiva/sclera: Conjunctivae normal.   Cardiovascular:      Rate and Rhythm: Normal rate and regular rhythm.      Pulses: Normal pulses.      Heart sounds: Normal heart sounds. No murmur heard.  Pulmonary:      Effort: Pulmonary effort is normal. No respiratory distress.      Breath sounds: Normal breath sounds.   Skin:     General: Skin is warm and dry.   Neurological:      General: No focal deficit present.      Mental Status: She is alert and oriented to person, place, and time.   Psychiatric:         Mood and Affect: Mood normal.         Behavior: Behavior normal.         Labs: No results found for: \"CHOLSTRLTOT\", \"LDL\", \"HDL\", \"TRIGLYCERIDE\"    Lab Results   Component Value " "Date/Time    SODIUM 140 07/01/2020 07:46 AM    POTASSIUM 4.2 07/01/2020 07:46 AM    CHLORIDE 104 07/01/2020 07:46 AM    CO2 22 07/01/2020 07:46 AM    GLUCOSE 86 07/01/2020 07:46 AM    BUN 13 07/01/2020 07:46 AM    CREATININE 0.67 07/01/2020 07:46 AM    BUNCREATRAT 19 07/01/2020 07:46 AM     Lab Results   Component Value Date/Time    ALKPHOSPHAT 96 07/01/2020 07:46 AM    ASTSGOT 20 07/01/2020 07:46 AM    ALTSGPT 21 07/01/2020 07:46 AM    TBILIRUBIN 0.4 07/01/2020 07:46 AM      No results found for: \"HBA1C\"  Lab Results   Component Value Date/Time    TSH 1.070 07/01/2020 07:46 AM         Assessment & Plan: Medical Decision Making     23 y.o. female with the following -     Problem List Items Addressed This Visit       Obesity (BMI 30-39.9)     Chronic condition-uncontrolled  -Exercise: At least 150 minutes of moderate aerobic activity per week or 75 minutes of vigorous aerobic activity per week, +2 days/week of strength training  - Healthy lifestyle and eating habits: Mediterranean-based diet (rich in fruits, vegetables, nuts and healthy oils), proper hydration and avoiding sugary beverages, adequate sleep hygiene-(allowing 7 to 8 hours of overnight sleep).           Relevant Orders    Patient identified as having weight management issue.  Appropriate orders and counseling given.     Other Visit Diagnoses       Need for vaccination        Relevant Orders    INFLUENZA VACCINE QUAD INJ (PF)    Tdap Vaccine =>8YO IM    Screening examination for sexually transmitted disease        Relevant Orders    HIV AG/AB Combo Assay Screening    T.Pallidum AB LYRIC (Screening)    Hepatitis C Virus Antibody    HEP B Surface Antibody    Hep B Core AB Total    Hep B Surface Antigen    Chlamydia/GC, PCR (Urine)            Differential diagnosis, natural history, supportive care, and indications for immediate follow-up discussed.  Shared decision making approach utilized, and patient is amendable with plan of care.  Patient understands to " return to clinic or go to the emergency department if symptoms worsen. All questions and concerns addressed to the best of my knowledge.    Return in about 1 year (around 11/29/2024), or if symptoms worsen or fail to improve.    Please note that this dictation was created using voice recognition software. I have made every reasonable attempt to correct obvious errors, but I expect that there are errors of grammar and possibly content that I did not discover before finalizing the note.

## 2024-01-30 ENCOUNTER — TELEMEDICINE (OUTPATIENT)
Dept: BEHAVIORAL HEALTH | Facility: CLINIC | Age: 24
End: 2024-01-30
Payer: COMMERCIAL

## 2024-01-30 DIAGNOSIS — F90.2 ADHD (ATTENTION DEFICIT HYPERACTIVITY DISORDER), COMBINED TYPE: ICD-10-CM

## 2024-01-30 PROCEDURE — 99214 OFFICE O/P EST MOD 30 MIN: CPT | Performed by: PSYCHIATRY & NEUROLOGY

## 2024-01-30 RX ORDER — LISDEXAMFETAMINE DIMESYLATE CAPSULES 40 MG/1
40 CAPSULE ORAL DAILY
Qty: 30 CAPSULE | Refills: 0 | Status: SHIPPED | OUTPATIENT
Start: 2024-01-30 | End: 2024-02-29

## 2024-01-30 RX ORDER — LISDEXAMFETAMINE DIMESYLATE CAPSULES 40 MG/1
40 CAPSULE ORAL DAILY
Qty: 30 CAPSULE | Refills: 0 | Status: SHIPPED | OUTPATIENT
Start: 2024-04-01 | End: 2024-05-01

## 2024-01-30 RX ORDER — LISDEXAMFETAMINE DIMESYLATE CAPSULES 40 MG/1
40 CAPSULE ORAL DAILY
Qty: 30 CAPSULE | Refills: 0 | Status: SHIPPED | OUTPATIENT
Start: 2024-03-01 | End: 2024-03-31

## 2024-01-30 NOTE — PROGRESS NOTES
This evaluation was conducted via Zoom using secure and encrypted videoconferencing technology. The patient was in a private location outside of their home in the state of [place].    The patient's identity was confirmed and verbal consent was obtained for this virtual visit.      PSYCHIATRY FOLLOW-UP NOTE      Name: Eda Rothman  MRN: [id number]  : 2000  Age: 23 y.o.  Date of assessment: 2024  PCP: Delfin Shore D.N.P.  Persons in attendance: Patient      REASON FOR VISIT/CHIEF COMPLAINT (as stated by Patient):  Eda Rothman is a 23 y.o.,  or  female, attending follow-up appointment for ADHD, mood and anxiety management.      HISTORY OF PRESENT ILLNESS:  Eda Rothman is a 23 y.o. old female with ADHD and anxiety comes in today for follow up. Patient was last seen 8 months ago, and following treatment planning recommendations were done:  Stop Ritalin LA 20 mg  Add Vyvanse 40 mg daily for ADHD.  Controlled medication treatment agreement signed on 22.  Referral placed for psychotherapy in past session for ADHD and anxiety management.  Information provided on ADHD coaches for ADHD management.    Currently not on Vyvanse.  Struggling with ADHD symptoms.  No side effects while taking Vyvanse. Ritalin & Adderal caused nausea but Vyvanse did not do any side effects.  Now she is back in school and interested in restarting Vyvanse.  Agreed with restarting at 40 mg dose for vyvanse.      CURRENT MEDICATIONS:  Current Outpatient Medications   Medication Sig Dispense Refill    fluticasone (FLONASE) 50 MCG/ACT nasal spray Administer 1 Spray into affected nostril(S) every day.      loratadine (CLARITIN) 10 MG Tab Take 10 mg by mouth every day. Indications: Hayfever       No current facility-administered medications for this visit.       MEDICAL HISTORY  Past Medical History:   Diagnosis Date    ADHD     Allergy      Past Surgical History:   Procedure Laterality Date     LUMPECTOMY            PAST PSYCHIATRIC MEDICATIONS  Adderall  Ritalin: most effective one       REVIEW OF SYSTEMS:        Constitutional negative   Eyes negative   Ears/Nose/Mouth/Throat negative   Cardiovascular negative   Respiratory negative   Gastrointestinal negative   Genitourinary negative   Muscular negative   Integumentary negative   Neurological negative   Endocrine negative   Hematologic/Lymphatic negative     PHYSICAL EXAMINAION:  Vital signs: There were no vitals taken for this visit.  Musculoskeletal: Normal gait.   Abnormal movements: none      MENTAL STATUS EXAMINATION      General:   - Grooming and hygiene: Casual,   - Apparent distress: none,   - Behavior: Calm  - Eye Contact:  Good,   - no psychomotor agitation or retardation    - Participation: Active verbal participation  Orientation: Alert and Fully Oriented to person, place and time  Mood: Euthymic  Affect: Flexible and Full range,  Thought Process: Logical  Thought Content: Denies suicidal or homicidal ideations, intent or plan   Perception: Denies auditory or visual hallucinations. No delusions noted  Attention span and concentration: fair  Speech:Rate within normal limits and Volume within normal limits  Language: Appropriate   Insight: Good  Judgment: Good  Recent and remote memory: No gross evidence of memory deficits        DEPRESSION SCREENING:      10/18/2021    11:30 AM 4/5/2022     2:30 PM 4/5/2023    10:00 AM   Depression Screen (PHQ-2/PHQ-9)   PHQ-2 Total Score 0 0 0       Interpretation of PHQ-9 Total Score   Score Severity   1-4 No Depression   5-9 Mild Depression   10-14 Moderate Depression   15-19 Moderately Severe Depression   20-27 Severe Depression    CURRENT RISK:       Suicidal: Low       Homicidal: Low       Self-Harm: Low       Relapse: Low       Crisis Safety Plan Reviewed Not Indicated       If evidence of imminent risk is present, intervention/plan:      MEDICAL RECORDS/LABS/DIAGNOSTIC TESTS REVIEWED:  No new lab  since last visit     [facility] records -   Reviewed     PLAN:  (1) ADHD; (2) Anxiety   Persistence of inattention  Restart Vyvanse 40 mg daily for ADHD.  Controlled medication treatment agreement signed on 6/2/22.  Referral placed for psychotherapy in past session for ADHD and anxiety management.  Information provided on ADHD coaches for ADHD management.  Medication options, alternatives (including no medications) and medication risks/benefits/side effects were discussed in detail.  Explained importance of contraceptive measures while on psychotropic medications, educated to let provider know if ever pregnant or wanting to become pregnant. Verbalized understanding.  The patient was advised to call, message provider on [employer], or come in to the clinic if symptoms worsen or if any future questions/issues regarding their medications arise; the patient verbalized understanding and agreement.    The patient was educated to call 911, call the suicide hotline, or go to local ER if having thoughts of suicide or homicide; verbalized understanding.    Return to clinic in 3-4 months or sooner if symptoms worsen.  Next Appointment: instruction provided on how to make the next appointment.     The proposed treatment plan was discussed with the patient who was provided the opportunity to ask questions and make suggestions regarding alternative treatment. Patient verbalized understanding and expressed agreement with the plan.       Ivan Briggs M.D.  01/30/24    This note was created using voice recognition software (Dragon). The accuracy of the dictation is limited by the abilities of the software. I have reviewed the note prior to signing, however some errors in grammar and context are still possible. If you have any questions related to this note please do not hesitate to contact our office.

## 2024-02-14 DIAGNOSIS — F90.2 ADHD (ATTENTION DEFICIT HYPERACTIVITY DISORDER), COMBINED TYPE: ICD-10-CM

## 2024-02-14 RX ORDER — LISDEXAMFETAMINE DIMESYLATE CAPSULES 40 MG/1
40 CAPSULE ORAL DAILY
Qty: 30 CAPSULE | Refills: 0 | OUTPATIENT
Start: 2024-02-14 | End: 2024-03-15

## 2024-02-14 RX ORDER — LISDEXAMFETAMINE DIMESYLATE 40 MG/1
40 TABLET, CHEWABLE ORAL DAILY
Qty: 30 TABLET | Refills: 0 | Status: SHIPPED | OUTPATIENT
Start: 2024-02-14 | End: 2024-03-15

## 2024-02-14 NOTE — TELEPHONE ENCOUNTER
Pts pharmacy called stating there is no capsules in stock only chewable tablets can you resend the RX        Received request via: Pharmacy    Was the patient seen in the last year in this department? Yes    Does the patient have an active prescription (recently filled or refills available) for medication(s) requested? No    Pharm[facility] Name[facility]     Does the patient have [facility] Plus and need 100 day supply (blood pressure, diabetes and cholesterol meds only)? Medication is not for cholesterol, blood pressure or diabetes

## 2024-06-03 ENCOUNTER — APPOINTMENT (OUTPATIENT)
Dept: MEDICAL GROUP | Facility: PHYSICIAN GROUP | Age: 24
End: 2024-06-03
Payer: COMMERCIAL

## 2024-06-03 VITALS
WEIGHT: 188.8 LBS | SYSTOLIC BLOOD PRESSURE: 90 MMHG | HEIGHT: 60 IN | DIASTOLIC BLOOD PRESSURE: 60 MMHG | OXYGEN SATURATION: 98 % | TEMPERATURE: 97.2 F | HEART RATE: 85 BPM | BODY MASS INDEX: 37.07 KG/M2

## 2024-06-03 DIAGNOSIS — N92.6 IRREGULAR PERIODS/MENSTRUAL CYCLES: ICD-10-CM

## 2024-06-03 DIAGNOSIS — L68.0 HIRSUTISM: ICD-10-CM

## 2024-06-03 DIAGNOSIS — Z13.6 SCREENING FOR CARDIOVASCULAR CONDITION: ICD-10-CM

## 2024-06-03 DIAGNOSIS — E66.9 OBESITY (BMI 30-39.9): ICD-10-CM

## 2024-06-03 DIAGNOSIS — Z13.1 SCREENING FOR DIABETES MELLITUS: ICD-10-CM

## 2024-06-03 LAB
HBA1C MFR BLD: 5.2 % (ref ?–5.8)
POCT INT CON NEG: NEGATIVE
POCT INT CON POS: POSITIVE

## 2024-06-03 PROCEDURE — 99214 OFFICE O/P EST MOD 30 MIN: CPT

## 2024-06-03 PROCEDURE — 83036 HEMOGLOBIN GLYCOSYLATED A1C: CPT

## 2024-06-03 PROCEDURE — 3074F SYST BP LT 130 MM HG: CPT

## 2024-06-03 PROCEDURE — 3078F DIAST BP <80 MM HG: CPT

## 2024-06-03 ASSESSMENT — ENCOUNTER SYMPTOMS
ABDOMINAL PAIN: 0
DIARRHEA: 0
FEVER: 0
WEIGHT LOSS: 0
CONSTIPATION: 0
SHORTNESS OF BREATH: 0
BLURRED VISION: 0
NAUSEA: 0
CHILLS: 0
COUGH: 0
HEADACHES: 0
MYALGIAS: 0
VOMITING: 0
WEAKNESS: 0
DIZZINESS: 0

## 2024-06-03 ASSESSMENT — PATIENT HEALTH QUESTIONNAIRE - PHQ9: CLINICAL INTERPRETATION OF PHQ2 SCORE: 0

## 2024-06-03 NOTE — PROGRESS NOTES
"Verbal consent was acquired by the patient to use [employer] ambient listening note generation during this visit  Subjective:     CC: Weight concerns    HPI:   Eda presents today with  History of Present Illness  The patient presents for evaluation of weight gain.    The patient's mother has suggested the initiation of Ozempic for weight management. She has a history of diabetes, which was successfully managed through weight loss. However, she has recently experienced a sudden weight gain, characterized by persistent grogginess, excessive sleepiness, and increased hunger. Despite regular gym visits, she struggles with dietary control. Over the past 5 to 6 months, she has noticed a rapid weight gain, approximately 50 pounds, despite dietary modifications. Her previous occupation involved an 8-hour daily exercise regimen, but she has since ceased this activity. She has no history of thyroid cancer or thyroid issues. Her biological father's side has a history of diabetes and obesity. She reports feeling exhausted after waking up around 6 or 7 in the morning, but falls back asleep throughout the day. She has previously tried keto, high-protein, low-calorie carbohydrates, macro counting, fruits, vegetables, and portion control.    The patient has been evaluated for Polycystic Ovary Syndrome (PCOS). She reports symptoms of excessive hair growth and irregular menstrual cycles, which commenced after the placement of a ParaGard device. She has not consulted an endocrinologist. Her menarche occurred at the age of 11.    Supplemental Information  She was on Adderall for ADHD since 01/2023.      Problem   Obesity (Bmi 30-39.9)    Wt: 188-pounds which is her heaviest  BMI: 36.87  Goal: 135-150  Exercise sporadic  Diet: Grades diet as a \"D\", has not been eating as healthy  Motivated to make dietary improvements.            ROS:  Review of Systems   Constitutional:  Positive for malaise/fatigue. Negative for chills, fever and " "weight loss.   Eyes:  Negative for blurred vision.   Respiratory:  Negative for cough and shortness of breath.    Cardiovascular:  Negative for chest pain.   Gastrointestinal:  Negative for abdominal pain, constipation, diarrhea, nausea and vomiting.   Musculoskeletal:  Negative for myalgias.   Neurological:  Negative for dizziness, weakness and headaches.       Objective:     Exam:  BP 90/60 (BP Location: Left arm, Patient Position: Sitting, BP Cuff Size: Adult)   Pulse 85   Temp 36.2 °C (97.2 °F) (Temporal)   Ht 1.524 m (5')   Wt 85.6 kg (188 lb 12.8 oz)   SpO2 98%   BMI 36.87 kg/m²  Body mass index is 36.87 kg/m².    Physical Exam  Constitutional:       General: She is not in acute distress.     Appearance: Normal appearance. She is not ill-appearing or toxic-appearing.   HENT:      Head: Normocephalic.      Comments: Facial hair evident on lateral aspect of the face and chin  Eyes:      Conjunctiva/sclera: Conjunctivae normal.   Cardiovascular:      Rate and Rhythm: Normal rate and regular rhythm.      Pulses: Normal pulses.      Heart sounds: Normal heart sounds. No murmur heard.  Pulmonary:      Effort: Pulmonary effort is normal. No respiratory distress.      Breath sounds: Normal breath sounds.   Skin:     General: Skin is warm and dry.   Neurological:      General: No focal deficit present.      Mental Status: She is alert and oriented to person, place, and time.   Psychiatric:         Mood and Affect: Mood normal.         Behavior: Behavior normal.         Labs:   No results found for: \"CHOLSTRLTOT\", \"LDL\", \"HDL\", \"TRIGLYCERIDE\"    Lab Results   Component Value Date/Time    SODIUM 140 07/01/2020 07:46 AM    POTASSIUM 4.2 07/01/2020 07:46 AM    CHLORIDE 104 07/01/2020 07:46 AM    CO2 22 07/01/2020 07:46 AM    GLUCOSE 86 07/01/2020 07:46 AM    BUN 13 07/01/2020 07:46 AM    CREATININE 0.67 07/01/2020 07:46 AM    BUNCREATRAT 19 07/01/2020 07:46 AM     Lab Results   Component Value Date/Time    " "ALKPHOSPHAT 96 07/01/2020 07:46 AM    ASTSGOT 20 07/01/2020 07:46 AM    ALTSGPT 21 07/01/2020 07:46 AM    TBILIRUBIN 0.4 07/01/2020 07:46 AM      Lab Results   Component Value Date/Time    HBA1C 5.2 06/03/2024 02:11 PM     Lab Results   Component Value Date/Time    TSH 1.070 07/01/2020 07:46 AM     No results found for: \"FREET4\"  No results found for: \"CBC\"    Assessment & Plan: Medical Decision Making     23 y.o. female with the following -   Assessment & Plan  1.  Obesity-BMI 36.87  Chronic uncontrolled  A comprehensive panel of labs will be conducted to assess cholesterol, metabolic panel, immune system, thyroid function, hormone levels, and cortisol levels. A prescription for Wegovy 0.25 mg, to be administered weekly for a duration of 1 month, will be provided. Should the patient tolerate the 0.25 mg dosage well, the dosage will be increased to 0.5 mg.    2.  Hirsutism  Suspect -polycystic ovary syndrome.  Chronic condition uncontrolled labs as followed    Problem List Items Addressed This Visit       Obesity (BMI 30-39.9)     Chronic condition-uncontrolled  Patient requesting semaglutide 0.25 mg weekly therefore will provide this as she has no history of thyroid cancer or family history of thyroid cancer or medullary endocrine dysplasia syndrome  Side effects of this medication discussed.  -Exercise: At least 150 minutes of moderate aerobic activity per week or 75 minutes of vigorous aerobic activity per week, +2 days/week of strength training  - Healthy lifestyle and eating habits: Mediterranean-based diet (rich in fruits, vegetables, nuts and healthy oils), proper hydration and avoiding sugary beverages, adequate sleep hygiene-(allowing 7 to 8 hours of overnight sleep).           Relevant Medications    Semaglutide (WEGOVY) 0.25 MG/0.5ML Solution Auto-injector Pen-injector    Other Relevant Orders    CBC WITH DIFFERENTIAL    Comp Metabolic Panel    Lipid Profile     Other Visit Diagnoses       Screening for " diabetes mellitus        Relevant Orders    POCT  A1C (Completed)    Hirsutism        Relevant Orders    [id number]    TESTOSTERONE,TOTAL FEM/CHILD    CORTISOL - AM    PROLACTIN    ESTRADIOL    LUTEINIZING HORMONE SERUM    FSH    TSH+FREE T4    CBC WITH DIFFERENTIAL    Irregular periods/menstrual cycles        Relevant Orders    [id number]    TESTOSTERONE,TOTAL FEM/CHILD    CORTISOL - AM    PROLACTIN    ESTRADIOL    LUTEINIZING HORMONE SERUM    FSH    TSH+FREE T4    CBC WITH DIFFERENTIAL    Screening for cardiovascular condition        Relevant Orders    Lipid Profile            Differential diagnosis, natural history, supportive care, and indications for immediate follow-up discussed.  Shared decision making approach utilized, and patient is amendable with plan of care.  Patient understands to return to clinic or go to the emergency department if symptoms worsen. All questions and concerns addressed to the best of my knowledge.    Return in about 3 months (around 9/3/2024), or if symptoms worsen or fail to improve.    Please note that this dictation was created using voice recognition software. I have made every reasonable attempt to correct obvious errors, but I expect that there are errors of grammar and possibly content that I did not discover before finalizing the note.

## 2024-06-03 NOTE — ASSESSMENT & PLAN NOTE
Chronic condition-uncontrolled  Patient requesting semaglutide 0.25 mg weekly therefore will provide this as she has no history of thyroid cancer or family history of thyroid cancer or medullary endocrine dysplasia syndrome  Side effects of this medication discussed.  -Exercise: At least 150 minutes of moderate aerobic activity per week or 75 minutes of vigorous aerobic activity per week, +2 days/week of strength training  - Healthy lifestyle and eating habits: Mediterranean-based diet (rich in fruits, vegetables, nuts and healthy oils), proper hydration and avoiding sugary beverages, adequate sleep hygiene-(allowing 7 to 8 hours of overnight sleep).

## 2024-08-08 ENCOUNTER — APPOINTMENT (OUTPATIENT)
Dept: MEDICAL GROUP | Facility: PHYSICIAN GROUP | Age: 24
End: 2024-08-08
Payer: COMMERCIAL

## 2024-09-18 ENCOUNTER — APPOINTMENT (OUTPATIENT)
Dept: MEDICAL GROUP | Facility: PHYSICIAN GROUP | Age: 24
End: 2024-09-18
Payer: COMMERCIAL

## 2024-10-18 DIAGNOSIS — F90.2 ADHD (ATTENTION DEFICIT HYPERACTIVITY DISORDER), COMBINED TYPE: ICD-10-CM

## 2024-10-18 RX ORDER — LISDEXAMFETAMINE DIMESYLATE 40 MG/1
40 CAPSULE ORAL DAILY
Qty: 30 CAPSULE | Refills: 0 | OUTPATIENT
Start: 2024-10-18 | End: 2024-11-17

## 2024-10-23 ENCOUNTER — TELEMEDICINE (OUTPATIENT)
Dept: BEHAVIORAL HEALTH | Facility: CLINIC | Age: 24
End: 2024-10-23
Payer: COMMERCIAL

## 2024-10-23 DIAGNOSIS — F90.2 ADHD (ATTENTION DEFICIT HYPERACTIVITY DISORDER), COMBINED TYPE: ICD-10-CM

## 2024-10-23 PROCEDURE — 99214 OFFICE O/P EST MOD 30 MIN: CPT | Performed by: PSYCHIATRY & NEUROLOGY

## 2024-10-23 RX ORDER — LISDEXAMFETAMINE DIMESYLATE 40 MG/1
40 CAPSULE ORAL DAILY
Qty: 30 CAPSULE | Refills: 0 | Status: SHIPPED | OUTPATIENT
Start: 2024-10-23 | End: 2024-11-22

## 2025-02-10 DIAGNOSIS — F90.2 ADHD (ATTENTION DEFICIT HYPERACTIVITY DISORDER), COMBINED TYPE: ICD-10-CM

## 2025-02-10 NOTE — TELEPHONE ENCOUNTER
Pt was unable to  Vyvanse RX that was sent on Oct.2024 b/c the pharmacy it was sent to did not have it instock pt followed up with that pharmacy now and they informed her they ended up cancelling the RX pt is now req we have it resent to new and updated pharmacy if possible.Please advise       Received request via: Patient    Was the patient seen in the last year in this department? Yes    Does the patient have an active prescription (recently filled or refills available) for medication(s) requested? No    Pharmacy Name: [id number]     Does the patient have halfway Plus and need 100-day supply? (This applies to ALL medications) Patient does not have SCP

## 2025-02-11 RX ORDER — LISDEXAMFETAMINE DIMESYLATE 40 MG/1
40 CAPSULE ORAL DAILY
Qty: 30 CAPSULE | Refills: 0 | Status: SHIPPED | OUTPATIENT
Start: 2025-02-11 | End: 2025-03-13

## 2025-02-20 ENCOUNTER — TELEPHONE (OUTPATIENT)
Dept: BEHAVIORAL HEALTH | Facility: CLINIC | Age: 25
End: 2025-02-20
Payer: COMMERCIAL

## 2025-02-20 NOTE — TELEPHONE ENCOUNTER
Caller Name: Eda Rothman    Call Back Number: [phone]    How would the patient prefer to be contacted with a response: Phone call do NOT leave a detailed message    Pt called and states she has had traumatic events happen this past week and she has been trying to deal with it. She c/o anxiety, and panic attacks. It is interfering with school/classes and she is requesting a medication to help with this. Please advise.    **Patient is requesting a call back with advice at 3:15 PM since she has to go to class.**

## 2025-02-25 NOTE — TELEPHONE ENCOUNTER
Phone Number Called: [phone]    Call outcome: Left detailed message for patient. Informed to call back with any additional questions.    Message: Called pt and left detailed VM to offer 1pm appt on 02/25/2025

## 2025-03-03 ENCOUNTER — APPOINTMENT (OUTPATIENT)
Dept: MEDICAL GROUP | Facility: PHYSICIAN GROUP | Age: 25
End: 2025-03-03
Payer: COMMERCIAL

## 2025-03-05 ENCOUNTER — TELEMEDICINE (OUTPATIENT)
Dept: BEHAVIORAL HEALTH | Facility: CLINIC | Age: 25
End: 2025-03-05
Payer: COMMERCIAL

## 2025-03-05 DIAGNOSIS — F41.9 ANXIETY: ICD-10-CM

## 2025-03-05 DIAGNOSIS — F90.2 ADHD (ATTENTION DEFICIT HYPERACTIVITY DISORDER), COMBINED TYPE: ICD-10-CM

## 2025-03-05 PROCEDURE — 99214 OFFICE O/P EST MOD 30 MIN: CPT | Mod: 95 | Performed by: PSYCHIATRY & NEUROLOGY

## 2025-03-05 RX ORDER — LISDEXAMFETAMINE DIMESYLATE 50 MG/1
50 CAPSULE ORAL EVERY MORNING
Qty: 30 CAPSULE | Refills: 0 | Status: SHIPPED | OUTPATIENT
Start: 2025-03-05 | End: 2025-04-04

## 2025-03-05 NOTE — PROGRESS NOTES
This evaluation was conducted via Teams using secure and encrypted videoconferencing technology. The patient was in their home in the [facility].    The patient's identity was confirmed and verbal consent was obtained for this virtual visit.      PSYCHIATRY FOLLOW-UP NOTE      Name: Eda Rothman  MRN: [id number]  : 2000  Age: 24 y.o.  Date of assessment: 3/5/2025  PCP: Delfin Shore D.N.P.  Persons in attendance: Patient      REASON FOR VISIT/CHIEF COMPLAINT (as stated by Patient):  Eda Rothman is a 24 y.o.,  or   White female, attending follow-up appointment for mood and anxiety management.      HISTORY OF PRESENT ILLNESS:  Eda Rothman is a 24 y.o. old female with ADHD and anxiety comes in today for follow up. Patient was last seen >4 months ago, and following treatment planning recommendations were done:  Restart Vyvanse 40 mg daily for ADHD.  Controlled medication treatment agreement signed on 22.  Referral placed for psychotherapy in past session for ADHD and anxiety management.  Information provided on ADHD coaches for ADHD management.    History of Present Illness    She has been experiencing difficulties in obtaining her Vyvanse 40 mg prescription from [facility], which she attributes to supply chain issues.   She reports an increase in demand and stress levels, leading to a perceived decrease in the medication's efficacy.   Agreed with increasing Vyavnse to 50 mg dose.  Also noticing anxiety due to increase in stress and will psychotherapy next week. Agreed with plan of assessing how anxiety does with increase in Vyavnse dose. If anxiety is not improving, agreed with adding a new medication for anxiety in the next appointment.        CURRENT MEDICATIONS:  Current Outpatient Medications   Medication Sig Dispense Refill    Lisdexamfetamine Dimesylate 40 MG Cap Take 1 Capsule by mouth every day for 30 days. 30 Capsule 0    Semaglutide (WEGOVY) 0.25  MG/0.5ML Solution Auto-injector Pen-injector Inject 0.5 mL under the skin every 7 days. 2 mL 1    fluticasone (FLONASE) 50 MCG/ACT nasal spray Administer 1 Spray into affected nostril(S) every day.      loratadine (CLARITIN) 10 MG Tab Take 10 mg by mouth every day. Indications: Hayfever       No current facility-administered medications for this visit.       MEDICAL HISTORY  Past Medical History:   Diagnosis Date    ADHD     Allergy      Past Surgical History:   Procedure Laterality Date    LUMPECTOMY         PAST PSYCHIATRIC MEDICATIONS  Adderall  Ritalin: most effective one   Vyvanse    REVIEW OF SYSTEMS:        Constitutional negative   Eyes negative   Ears/Nose/Mouth/Throat negative   Cardiovascular negative   Respiratory negative   Gastrointestinal negative   Genitourinary negative   Muscular negative   Integumentary negative   Neurological negative   Endocrine negative   Hematologic/Lymphatic negative     PHYSICAL EXAMINAION:  Vital signs: There were no vitals taken for this visit.  Musculoskeletal: Normal gait.   Abnormal movements: none      MENTAL STATUS EXAMINATION      General:   - Grooming and hygiene: Casual,   - Apparent distress: none,   - Behavior: Calm  - Eye Contact:  Good,   - no psychomotor agitation or retardation    - Participation: Active verbal participation  Orientation: Alert and Fully Oriented to person, place and time  Mood: Anxious  Affect: Flexible and Full range,  Thought Process: Logical and Goal-directed  Thought Content: Denies suicidal or homicidal ideations, intent or plan   Perception: Denies auditory or visual hallucinations. No delusions noted   Attention span and concentration: fair  Speech:Rate within normal limits and Volume within normal limits  Language: Appropriate   Insight: Good  Judgment: Good  Recent and remote memory: No gross evidence of memory deficits        DEPRESSION SCREENIN/5/2022     2:30 PM 2023    10:00 AM 6/3/2024     1:40 PM   Depression  Screen (PHQ-2/PHQ-9)   PHQ-2 Total Score 0 0 0       Interpretation of PHQ-9 Total Score   Score Severity   1-4 No Depression   5-9 Mild Depression   10-14 Moderate Depression   15-19 Moderately Severe Depression   20-27 Severe Depression    CURRENT RISK:       Suicidal: Low       Homicidal: Low       Self-Harm: Low       Relapse: Low       Crisis Safety Plan Reviewed Not Indicated       If evidence of imminent risk is present, intervention/plan:      MEDICAL RECORDS/LABS/DIAGNOSTIC TESTS REVIEWED:  No new lab since last visit     [place]  records -   Reviewed     PLAN:  (1) ADHD; (2) Anxiety   Persistence of inattention  Increase Vyvanse 50 mg daily for ADHD.  Controlled medication treatment agreement signed on 6/2/22.  Restart psychotherapy for ADHD and anxiety management.  Information provided on ADHD coaches for ADHD management.  Medication options, alternatives (including no medications) and medication risks/benefits/side effects were discussed in detail.  Explained importance of contraceptive measures while on psychotropic medications, educated to let provider know if ever pregnant or wanting to become pregnant. Verbalized understanding.  The patient was advised to call, message provider on [email or web address], or come in to the clinic if symptoms worsen or if any future questions/issues regarding their medications arise; the patient verbalized understanding and agreement.    The patient was educated to call 911, call the suicide hotline, or go to local ER if having thoughts of suicide or homicide; verbalized understanding.      Billing Coding based on:  03735 based on [facility]    Return to clinic in 1 month or sooner if symptoms worsen.  Next Appointment: instruction provided on how to make the next appointment.     The proposed treatment plan was discussed with the patient who was provided the opportunity to ask questions and make suggestions regarding alternative treatment. Patient verbalized understanding and expressed  agreement with the plan.       Ivan Briggs M.D.  03/05/25    This note was created using voice recognition software (Dragon). The accuracy of the dictation is limited by the abilities of the software. I have reviewed the note prior to signing, however some errors in grammar and context are still possible. If you have any questions related to this note please do not hesitate to contact our office.

## 2025-06-19 ENCOUNTER — TELEMEDICINE (OUTPATIENT)
Dept: BEHAVIORAL HEALTH | Facility: CLINIC | Age: 25
End: 2025-06-19
Payer: COMMERCIAL

## 2025-06-19 DIAGNOSIS — F90.2 ADHD (ATTENTION DEFICIT HYPERACTIVITY DISORDER), COMBINED TYPE: Primary | ICD-10-CM

## 2025-06-19 DIAGNOSIS — F41.9 ANXIETY: ICD-10-CM

## 2025-06-19 PROCEDURE — 99214 OFFICE O/P EST MOD 30 MIN: CPT | Mod: 95 | Performed by: PSYCHIATRY & NEUROLOGY

## 2025-06-19 RX ORDER — LISDEXAMFETAMINE DIMESYLATE 50 MG/1
50 CAPSULE ORAL EVERY MORNING
Qty: 30 CAPSULE | Refills: 0 | Status: SHIPPED | OUTPATIENT
Start: 2025-06-19 | End: 2025-07-19

## 2025-06-19 RX ORDER — LISDEXAMFETAMINE DIMESYLATE 50 MG/1
50 CAPSULE ORAL EVERY MORNING
Qty: 30 CAPSULE | Refills: 0 | Status: SHIPPED | OUTPATIENT
Start: 2025-08-20 | End: 2025-09-19

## 2025-06-19 RX ORDER — LISDEXAMFETAMINE DIMESYLATE 50 MG/1
50 CAPSULE ORAL EVERY MORNING
Qty: 30 CAPSULE | Refills: 0 | Status: SHIPPED | OUTPATIENT
Start: 2025-07-20 | End: 2025-08-19

## 2025-06-19 ASSESSMENT — PATIENT HEALTH QUESTIONNAIRE - PHQ9
SUM OF ALL RESPONSES TO PHQ QUESTIONS 1-9: 2
5. POOR APPETITE OR OVEREATING: 0 - NOT AT ALL
7. TROUBLE CONCENTRATING ON THINGS, SUCH AS READING THE NEWSPAPER OR WATCHING TELEVISION: NOT AT ALL
9. THOUGHTS THAT YOU WOULD BE BETTER OFF DEAD, OR OF HURTING YOURSELF: NOT AT ALL
3. TROUBLE FALLING OR STAYING ASLEEP OR SLEEPING TOO MUCH: 2
2. FEELING DOWN, DEPRESSED, IRRITABLE, OR HOPELESS: NOT AT ALL
7. TROUBLE CONCENTRATING ON THINGS, SUCH AS READING THE NEWSPAPER OR WATCHING TELEVISION: 0
6. FEELING BAD ABOUT YOURSELF - OR THAT YOU ARE A FAILURE OR HAVE LET YOURSELF OR YOUR FAMILY DOWN: NOT AT ALL
SUM OF ALL RESPONSES TO PHQ QUESTIONS 1-9: 2
1. LITTLE INTEREST OR PLEASURE IN DOING THINGS: NOT AT ALL
8. MOVING OR SPEAKING SO SLOWLY THAT OTHER PEOPLE COULD HAVE NOTICED. OR THE OPPOSITE, BEING SO FIGETY OR RESTLESS THAT YOU HAVE BEEN MOVING AROUND A LOT MORE THAN USUAL: NOT AT ALL
6. FEELING BAD ABOUT YOURSELF - OR THAT YOU ARE A FAILURE OR HAVE LET YOURSELF OR YOUR FAMILY DOWN: 0
4. FEELING TIRED OR HAVING LITTLE ENERGY: 0
9. THOUGHTS THAT YOU WOULD BE BETTER OFF DEAD, OR OF HURTING YOURSELF: 0
5. POOR APPETITE OR OVEREATING: NOT AT ALL
5. POOR APPETITE OR OVEREATING: 0
10. IF YOU CHECKED OFF ANY PROBLEMS, HOW DIFFICULT HAVE THESE PROBLEMS MADE IT FOR YOU TO DO YOUR WORK, TAKE CARE OF THINGS AT HOME, OR GET ALONG WITH OTHER PEOPLE: NOT DIFFICULT AT ALL
4. FEELING TIRED OR HAVING LITTLE ENERGY: NOT AT ALL
2. FEELING DOWN, DEPRESSED, IRRITABLE, OR HOPELESS: 0
1. LITTLE INTEREST OR PLEASURE IN DOING THINGS: 0
3. TROUBLE FALLING OR STAYING ASLEEP OR SLEEPING TOO MUCH: MORE THAN HALF THE DAYS
8. MOVING OR SPEAKING SO SLOWLY THAT OTHER PEOPLE COULD HAVE NOTICED. OR THE OPPOSITE, BEING SO FIGETY OR RESTLESS THAT YOU HAVE BEEN MOVING AROUND A LOT MORE THAN USUAL: 0

## 2025-06-19 ASSESSMENT — ANXIETY QUESTIONNAIRES
1. FEELING NERVOUS, ANXIOUS, OR ON EDGE: NOT AT ALL
6. BECOMING EASILY ANNOYED OR IRRITABLE: NOT AT ALL
2. NOT BEING ABLE TO STOP OR CONTROL WORRYING: NOT AT ALL
5. BEING SO RESTLESS THAT IT IS HARD TO SIT STILL: NOT AT ALL
GAD7 TOTAL SCORE: 0
6. BECOMING EASILY ANNOYED OR IRRITABLE: NOT AT ALL
5. BEING SO RESTLESS THAT IT IS HARD TO SIT STILL: NOT AT ALL
IF YOU CHECKED OFF ANY PROBLEMS ON THIS QUESTIONNAIRE, HOW DIFFICULT HAVE THESE PROBLEMS MADE IT FOR YOU TO DO YOUR WORK, TAKE CARE OF THINGS AT HOME, OR GET ALONG WITH OTHER PEOPLE: NOT DIFFICULT AT ALL
7. FEELING AFRAID AS IF SOMETHING AWFUL MIGHT HAPPEN: NOT AT ALL
4. TROUBLE RELAXING: NOT AT ALL
3. WORRYING TOO MUCH ABOUT DIFFERENT THINGS: NOT AT ALL
7. FEELING AFRAID AS IF SOMETHING AWFUL MIGHT HAPPEN: NOT AT ALL
3. WORRYING TOO MUCH ABOUT DIFFERENT THINGS: NOT AT ALL
1. FEELING NERVOUS, ANXIOUS, OR ON EDGE: NOT AT ALL
2. NOT BEING ABLE TO STOP OR CONTROL WORRYING: NOT AT ALL
IF YOU CHECKED OFF ANY PROBLEMS ON THIS QUESTIONNAIRE, HOW DIFFICULT HAVE THESE PROBLEMS MADE IT FOR YOU TO DO YOUR WORK, TAKE CARE OF THINGS AT HOME, OR GET ALONG WITH OTHER PEOPLE: NOT DIFFICULT AT ALL
4. TROUBLE RELAXING: NOT AT ALL

## 2025-06-19 NOTE — PROGRESS NOTES
This evaluation was conducted via Teams using secure and encrypted videoconferencing technology. The patient was in their home in the [facility].    The patient's identity was confirmed and verbal consent was obtained for this virtual visit.      PSYCHIATRY FOLLOW-UP NOTE      Name: Eda Rothman  MRN: [id number]  : 2000  Age: 24 y.o.  Date of assessment: 2025  PCP: Delfin Shore D.N.P.  Persons in attendance: Patient      REASON FOR VISIT/CHIEF COMPLAINT (as stated by Patient):  Eda Rothman is a 24 y.o.,  or   White female, attending follow-up appointment for ADHD, mood and anxiety management.      HISTORY OF PRESENT ILLNESS:  Eda Rothman is a 24 y.o. old female with ADHD and anxiety comes in today for follow up. Patient was last seen 3 months ago, and following treatment planning recommendations were done:  Increase Vyvanse 50 mg daily for ADHD.  Controlled medication treatment agreement signed on 22.  Restart psychotherapy for ADHD and anxiety management.  Information provided on ADHD coaches for ADHD management.    History of Present Illness    She reports a positive response to the increased dosage of Vyvanse, with no significant side effects.   She experiences headaches and mild dizziness when she does not eat, but these symptoms are not severe.   Her sleep pattern remains unaffected by the medication. She does not experience any cardiac-related symptoms such as chest pain, palpitations, or jitteriness.  She has abstained from consuming coffee or energy drinks while on Vyvanse.   She has not been prescribed any new medications by other physicians.   She notes an improvement in her productivity since the increase in Vyvanse dosage, which she attributes to its effectiveness in managing her stressors. She expresses satisfaction with the current treatment regimen, stating that it is more beneficial than her previous Ritalin therapy.   She prefers  not to add a second medication for anxiety or mood at this time.         CURRENT MEDICATIONS:  Current Medications[1]    MEDICAL HISTORY  Past Medical History[2]  Past Surgical History[3]      PAST PSYCHIATRIC MEDICATIONS  Adderall  Ritalin: most effective one   Vyvanse    REVIEW OF SYSTEMS:        Constitutional negative   Eyes negative   Ears/Nose/Mouth/Throat negative   Cardiovascular negative   Respiratory negative   Gastrointestinal negative   Genitourinary negative   Muscular negative   Integumentary negative   Neurological negative   Endocrine negative   Hematologic/Lymphatic negative     PHYSICAL EXAMINAION:  Vital signs: There were no vitals taken for this visit.  Musculoskeletal: Normal gait.   Abnormal movements: none      MENTAL STATUS EXAMINATION      General:   - Grooming and hygiene: Casual,   - Apparent distress: none,   - Behavior: Calm  - Eye Contact:  Good,   - no psychomotor agitation or retardation    - Participation: Active verbal participation  Orientation: Alert and Fully Oriented to person, place and time  Mood: Euthymic  Affect: Flexible and Full range,  Thought Process: Logical and Goal-directed  Thought Content: Denies suicidal or homicidal ideations, intent or plan   Perception: Denies auditory or visual hallucinations. No delusions noted   Attention span and concentration: Intact   Speech:Rate within normal limits and Volume within normal limits  Language: Appropriate   Insight: Good  Judgment: Good  Recent and remote memory: No gross evidence of memory deficits        DEPRESSION SCREENIN/5/2022     2:30 PM 2023    10:00 AM 6/3/2024     1:40 PM   Depression Screen (PHQ-2/PHQ-9)   PHQ-2 Total Score 0 0 0       Interpretation of PHQ-9 Total Score   Score Severity   1-4 No Depression   5-9 Mild Depression   10-14 Moderate Depression   15-19 Moderately Severe Depression   20-27 Severe Depression    CURRENT RISK:       Suicidal: Low       Homicidal: Low       Self-Harm: Low        Relapse: Low       Crisis Safety Plan Reviewed Not Indicated       If evidence of imminent risk is present, intervention/plan:      MEDICAL RECORDS/LABS/DIAGNOSTIC TESTS REVIEWED:  No new lab since last visit     [place]  records -   [place]ed     PLAN:  (1) ADHD; (2) Anxiety   improving  Continue Vyvanse 50 mg daily for ADHD.  Controlled medication treatment agreement signed on 6/2/22.  Restart psychotherapy for ADHD and anxiety management.  Information provided on ADHD coaches for ADHD management.  Medication options, alternatives (including no medications) and medication risks/benefits/side effects were discussed in detail.  Explained importance of contraceptive measures while on psychotropic medications, educated to let provider know if ever pregnant or wanting to become pregnant. Verbalized understanding.  The patient was advised to call, message provider on [employer], or come in to the clinic if symptoms worsen or if any future questions/issues regarding their medications arise; the patient verbalized understanding and agreement.    The patient was educated to call 911, call the suicide hotline, or go to local ER if having thoughts of suicide or homicide; verbalized understanding.      Billing Coding based on:  96991 based on MDM    Return to clinic in 3 months or sooner if symptoms worsen.  Next Appointment: instruction provided on how to make the next appointment.     The proposed treatment plan was discussed with the patient who was provided the opportunity to ask questions and make suggestions regarding alternative treatment. Patient verbalized understanding and expressed agreement with the plan.       Ivan Briggs M.D.  06/19/25    This note was created using voice recognition software (Dragon). The accuracy of the dictation is limited by the abilities of the software. I have reviewed the note prior to signing, however some errors in grammar and context are still possible. If you have any questions  related to this note please do not hesitate to contact our office.            [1]   Current Outpatient Medications   Medication Sig Dispense Refill    Semaglutide (WEGOVY) 0.25 MG/0.5ML Solution Auto-injector Pen-injector Inject 0.5 mL under the skin every 7 days. 2 mL 1    fluticasone (FLONASE) 50 MCG/ACT nasal spray Administer 1 Spray into affected nostril(S) every day.      loratadine (CLARITIN) 10 MG Tab Take 10 mg by mouth every day. Indications: Hayfever       No current facility-administered medications for this visit.   [2]   Past Medical History:  Diagnosis Date    ADHD     Allergy    [3]   Past Surgical History:  Procedure Laterality Date    LUMPECTOMY